# Patient Record
Sex: MALE | Race: BLACK OR AFRICAN AMERICAN | NOT HISPANIC OR LATINO | Employment: FULL TIME | URBAN - METROPOLITAN AREA
[De-identification: names, ages, dates, MRNs, and addresses within clinical notes are randomized per-mention and may not be internally consistent; named-entity substitution may affect disease eponyms.]

---

## 2020-04-24 ENCOUNTER — CLINICAL SUPPORT (OUTPATIENT)
Dept: URGENT CARE | Facility: CLINIC | Age: 35
End: 2020-04-24
Payer: MEDICAID

## 2020-04-24 VITALS
OXYGEN SATURATION: 99 % | TEMPERATURE: 98 F | RESPIRATION RATE: 12 BRPM | WEIGHT: 152.81 LBS | HEART RATE: 77 BPM | SYSTOLIC BLOOD PRESSURE: 136 MMHG | DIASTOLIC BLOOD PRESSURE: 81 MMHG

## 2020-04-24 DIAGNOSIS — R36.9 PENILE DISCHARGE: ICD-10-CM

## 2020-04-24 DIAGNOSIS — Z20.2 STD EXPOSURE: Primary | ICD-10-CM

## 2020-04-24 DIAGNOSIS — N39.0 URINARY TRACT INFECTION WITHOUT HEMATURIA, SITE UNSPECIFIED: ICD-10-CM

## 2020-04-24 LAB
BILIRUB UR QL STRIP: NEGATIVE
GLUCOSE UR QL STRIP: NEGATIVE
KETONES UR QL STRIP: NEGATIVE
LEUKOCYTE ESTERASE UR QL STRIP: POSITIVE
PH, POC UA: 5.5
POC BLOOD, URINE: NEGATIVE
POC NITRATES, URINE: NEGATIVE
PROT UR QL STRIP: POSITIVE
SP GR UR STRIP: 1.02 (ref 1–1.03)
UROBILINOGEN UR STRIP-ACNC: ABNORMAL (ref 0.3–2.2)

## 2020-04-24 PROCEDURE — 99204 OFFICE O/P NEW MOD 45 MIN: CPT | Mod: 25,S$GLB,, | Performed by: NURSE PRACTITIONER

## 2020-04-24 PROCEDURE — 81003 URINALYSIS AUTO W/O SCOPE: CPT | Mod: QW,S$GLB,, | Performed by: NURSE PRACTITIONER

## 2020-04-24 PROCEDURE — 99204 PR OFFICE/OUTPT VISIT, NEW, LEVL IV, 45-59 MIN: ICD-10-PCS | Mod: 25,S$GLB,, | Performed by: NURSE PRACTITIONER

## 2020-04-24 PROCEDURE — 81003 POCT URINALYSIS, DIPSTICK, AUTOMATED, W/O SCOPE: ICD-10-PCS | Mod: QW,S$GLB,, | Performed by: NURSE PRACTITIONER

## 2020-04-24 RX ORDER — CEFTRIAXONE 500 MG/1
500 INJECTION, POWDER, FOR SOLUTION INTRAMUSCULAR; INTRAVENOUS
Status: COMPLETED | OUTPATIENT
Start: 2020-04-24 | End: 2020-04-24

## 2020-04-24 RX ORDER — NITROFURANTOIN 25; 75 MG/1; MG/1
100 CAPSULE ORAL 2 TIMES DAILY
Qty: 14 CAPSULE | Refills: 0 | Status: SHIPPED | OUTPATIENT
Start: 2020-04-24 | End: 2020-05-01

## 2020-04-24 RX ORDER — AZITHROMYCIN 1 G/1
1 POWDER, FOR SUSPENSION ORAL ONCE
Qty: 1 PACKET | Refills: 0 | Status: SHIPPED | OUTPATIENT
Start: 2020-04-24 | End: 2020-04-24

## 2020-04-24 RX ORDER — METRONIDAZOLE 500 MG/1
500 TABLET ORAL EVERY 12 HOURS
Qty: 14 TABLET | Refills: 0 | Status: SHIPPED | OUTPATIENT
Start: 2020-04-24 | End: 2020-05-01

## 2020-04-24 RX ADMIN — CEFTRIAXONE 500 MG: 500 INJECTION, POWDER, FOR SOLUTION INTRAMUSCULAR; INTRAVENOUS at 11:04

## 2020-04-24 NOTE — PROGRESS NOTES
Subjective:       Patient ID: Kenrick Peres is a 34 y.o. male.    Vitals:  weight is 69.3 kg (152 lb 12.8 oz). His temperature is 98.4 °F (36.9 °C). His blood pressure is 136/81 and his pulse is 77. His respiration is 12 and oxygen saturation is 99%.     Chief Complaint: Penile Discharge    Patient complains of yellow penile discharge that began this week. Reports he is also having urinary burning. Patient reports he has had one sexual partner.     Penile Discharge   The patient's primary symptoms include penile discharge. This is a new problem. The current episode started in the past 7 days (x 2 days ). The problem occurs constantly. The problem has been unchanged. Associated symptoms include dysuria, frequency and urgency. Pertinent negatives include no chest pain, chills, coughing, diarrhea, fever, headaches, nausea, rash, shortness of breath, sore throat or vomiting. Associated symptoms comments: Penile burning with yellow discharge . The patient's penis is circumcised.The penile discharge was yellow. He has tried nothing for the symptoms. The treatment provided no relief. He is sexually active. He inconsistently uses condoms. It is possible that his partner has an STD.       Constitution: Negative for chills, fatigue and fever.   HENT: Negative for congestion and sore throat.    Neck: Negative for painful lymph nodes.   Cardiovascular: Negative for chest pain and leg swelling.   Eyes: Negative for double vision and blurred vision.   Respiratory: Negative for cough and shortness of breath.    Gastrointestinal: Negative for nausea, vomiting and diarrhea.   Genitourinary: Positive for dysuria, frequency, urgency and penile discharge.   Musculoskeletal: Negative for joint pain, joint swelling, muscle cramps and muscle ache.   Skin: Negative for color change, pale and rash.   Allergic/Immunologic: Negative for seasonal allergies.   Neurological: Negative for dizziness, history of vertigo, light-headedness,  passing out and headaches.   Hematologic/Lymphatic: Negative for swollen lymph nodes, easy bruising/bleeding and history of blood clots. Does not bruise/bleed easily.   Psychiatric/Behavioral: Negative for nervous/anxious, sleep disturbance and depression. The patient is not nervous/anxious.        Objective:      Physical Exam   Constitutional: He is oriented to person, place, and time. Vital signs are normal. He appears well-developed and well-nourished. He is cooperative.  Non-toxic appearance. He does not have a sickly appearance. He does not appear ill. No distress.   HENT:   Head: Normocephalic and atraumatic.   Right Ear: Hearing, tympanic membrane, external ear and ear canal normal.   Left Ear: Hearing, tympanic membrane, external ear and ear canal normal.   Nose: Nose normal. No mucosal edema, rhinorrhea or nasal deformity. No epistaxis. Right sinus exhibits no maxillary sinus tenderness and no frontal sinus tenderness. Left sinus exhibits no maxillary sinus tenderness and no frontal sinus tenderness.   Mouth/Throat: Uvula is midline, oropharynx is clear and moist and mucous membranes are normal. No trismus in the jaw. Normal dentition. No uvula swelling. No posterior oropharyngeal erythema.   Eyes: Conjunctivae and lids are normal. Right eye exhibits no discharge. Left eye exhibits no discharge. No scleral icterus.   Neck: Trachea normal, normal range of motion, full passive range of motion without pain and phonation normal. Neck supple.   Cardiovascular: Normal rate, regular rhythm, normal heart sounds, intact distal pulses and normal pulses.   Pulmonary/Chest: Effort normal and breath sounds normal. No respiratory distress.   Abdominal: Soft. Normal appearance and bowel sounds are normal. He exhibits no distension, no pulsatile midline mass and no mass. There is no tenderness.   Genitourinary: Circumcised.   Musculoskeletal: Normal range of motion. He exhibits no edema or deformity.   Neurological: He is  alert and oriented to person, place, and time. He exhibits normal muscle tone. Coordination normal. GCS eye subscore is 4. GCS verbal subscore is 5. GCS motor subscore is 6.   Skin: Skin is warm, dry, intact, not diaphoretic and not pale.   Psychiatric: He has a normal mood and affect. His speech is normal and behavior is normal. Judgment and thought content normal. Cognition and memory are normal.   Nursing note and vitals reviewed.        Assessment:       1. STD exposure    2. Penile discharge    3. Urinary tract infection without hematuria, site unspecified        Plan:       After careful evaluation of the patient's physical exam and history of present illness, I believe the patient is at risk for a sexually transmitted disease due to either possible exposure for known exposure.  I will treat the patient with rocephin in the clinic.  I discussed abstaining from sex ×2 weeks, and must inform all sexual partners of possible STD exposure.  Instructed patient to follow up with their primary care, and I provided an STD clinic contact information for continued evaluation.  Patient was allowed to ask questions, and verbalizes understanding    The patient's symptoms are consistent with a urinary tract infection.  The patient does not appear to have pyelonephritis, urinary retention, ureterolithiasis, or urosepsis.  The patient will be treated with macrobid pending a urine culture. Instructed patient to follow up with PCP or urology if symptoms continue. Instructions given on when to go to the ED.     Advised: Complete all your antibiotics. Refrain from having sex for 2 weeks. We will call you with the results of your labs, if anything is positive, your partner(s) must be treated.   DO NOT drink alcohol while taking Metronidazole (Flagyl).  You were not tested today for life threatening sexually transmitted diseases, such as HIV and Syphilis.  For further evaluation: Follow up at                      [facility]                    Sexual [facility] Clinic                    [address] [facility] [address]                    [phone]    STD exposure  -     POCT Urinalysis, Dipstick, Automated, W/O Scope  -     CT/NG, T. vaginalis; Future; Expected date: 04/24/2020  -     Culture, Urine    Penile discharge    Urinary tract infection without hematuria, site unspecified    Other orders  -     cefTRIAXone injection 500 mg  -     azithromycin (ZITHROMAX) 1 gram Pack; Take 1 g by mouth once. for 1 dose  Dispense: 1 packet; Refill: 0  -     nitrofurantoin, macrocrystal-monohydrate, (MACROBID) 100 MG capsule; Take 1 capsule (100 mg total) by mouth 2 (two) times daily. for 7 days  Dispense: 14 capsule; Refill: 0  -     metroNIDAZOLE (FLAGYL) 500 MG tablet; Take 1 tablet (500 mg total) by mouth every 12 (twelve) hours. for 7 days  Dispense: 14 tablet; Refill: 0

## 2020-04-24 NOTE — PATIENT INSTRUCTIONS
Complete all your antibiotics. Refrain from having sex for 2 weeks. We will call you with the results of your labs, if anything is positive, your partner(s) must be treated.   DO NOT drink alcohol while taking Metronidazole (Flagyl).  You were not tested today for life threatening sexually transmitted diseases, such as HIV and Syphilis.  For further evaluation: Follow up at                      [facility]                    Sexual Health Clinic                    [address] [place] [address]                    [phone]      Urinary Tract Infections in Men    Urinary tract infections (UTIs) are most often caused by bacteria that invade the urinary tract. The bacteria may come from outside the body. Or they may travel from the skin outside of rectum into the urethra. Pain in or around the urinary tract is a common symptom for most UTIs. But the only way to know for sure if you have a UTI is to have a urinalysis and urine culture.   Types of UTIs  · Cystitis: This is a bladder infection and is often linked to a blockage from an enlarged prostate. You may have an urgent or frequent need to urinate, and bloody urine. Treatment includes antibiotics and medicine to relax or shrink the prostate. Sometimes, surgery is needed.  · Urethritis: This is an infection of the urethra. You may have a discharge from the urethra or burning when you urinate. You may also have pain in the urethra or penis. It is treated with antibiotics.  · Prostatitis: This is an inflammation or infection of the prostate. You may have an urgent or frequent need to urinate, fever, or burning when you urinate. Or you may have a tender prostate, or a vague feeling of pressure. Prostatitis is treated with a range of medicines, depending on the cause.  · Pyelonephritis: This is a kidney infection. If not treated, it can be serious and damage your kidneys. In severe cases you may be hospitalized.  You may have a fever and upper back pain.  Treating a UTI  · Medicine: Most UTIs are treated with antibiotics. These kill the bacteria. The length of time you need to take them depends on the type of infection. Take antibiotics exactly as directed until all of the medicine is gone. If you don't, the infection may not go away and may become harder to treat. For certain types of UTIs, you may be given other medicine to help treat your symptoms.  · Lifestyle changes: The lifestyle changes below will help get rid of your current infection. They may also help prevent future UTIs.  ¨ Drink plenty of fluids such as water, juice, or other caffeine-free drinks. This helps flush bacteria out of your system.  ¨ Empty your bladder when you feel the urge to urinate and before going to sleep. Urine that stays in your bladder promotes infection.  ¨ Use condoms during sex. These help prevent UTIs caused by sexually transmitted bacteria.  ¨ Keep follow-up appointments with your healthcare provider. He or she can may do tests to make sure the infection has cleared. If needed, more treatment can be started.  · Other treatment: Most UTIs respond to medicine. But sometimes a procedure or surgery is needed. This can treat an enlarged prostate, or remove a kidney stone or other blockage. Surgery may also treat problems caused by scarring or long-term infections.  Date Last Reviewed: 1/1/2017 © 2000-2017 [employer]. [address] [place] [address]. All rights reserved. This information is not intended as a substitute for professional medical care. Always follow your healthcare professional's instructions.        Suspected STI, Culture Only  Your symptoms suggest that you may have a sexually transmitted infection (STI). The most common bacteria that cause STIs are chlamydia and gonorrhea. Both are highly contagious. They are passed by sexual contact with an infected partner.  Symptoms begin within 1 to 3 weeks  after exposure. There is usually a discharge from the penis or vagina and burning during urination. Many women with one of these infections will have only mild symptoms or no symptoms at all early in the disease.  Culture tests have been taken. These will show if you have an infection with chlamydia or gonorrhea. These infections can be treated and cured with antibiotic medicine.  Home care  Do not have sex until you know that your test result is negative.  If a culture was done, call for the results. If the culture is positive, contact your healthcare provider, local clinic, or local public health department to be treated, or return to our facility.  · You will be prescribed antibiotic medicine. Be sure to take all of the antibiotic as prescribed until it is gone or you are told to stop. Keep taking it even if you feel better.  · Both you and your sexual partner or partners need to be treated, even if the partner has no symptoms.  · Do not have sex until both you and your partner or partners have finished all antibiotic medicine and you are told that you are no longer contagious.  Learn about safe sex practices and use these in the future. The safest sex is with a partner who has tested negative for STIs and only has sex with you. Condoms can help prevent the spread of gonorrhea and chlamydia, but are not a guarantee.  Follow-up care  Follow up with your healthcare provider or as advised by our staff. Call as directed for the results of your culture. If your culture test is positive and you are treated with an antibiotic, you should have another culture taken 4 to 6 weeks after treatment. This is to be sure the infection has cleared. Follow up with your provider or the public health department for complete STI screening, including HIV testing. For more information about STIs, call the CDC information line at 955-108-2807.  When to seek medical advice  Call your healthcare provider if any of these  occur:  · Fever of 100.4ºF (38.0ºC) or higher, or as directed  · New pain in your lower belly (abdomen) or back or pain that gets worse  · Unexpected vaginal bleeding  · Weakness, dizziness, or fainting  · Repeated vomiting  · Inability to urinate because of pain  · Rash or joint pain  · Painful open sores on the penis or around the outer vagina  · Enlarged painful lumps (lymph nodes) in the groin  · Testicle pain or scrotal swelling in men  Date Last Reviewed: 9/25/2015  © 7848-1573 [employer]. [address] [place] [address]. All rights reserved. This information is not intended as a substitute for professional medical care. Always follow your healthcare professional's instructions.

## 2020-04-25 LAB
C TRACH RRNA SPEC QL NAA+PROBE: NEGATIVE
N GONORRHOEA RRNA SPEC QL NAA+PROBE: POSITIVE
T VAGINALIS DNA SPEC QL NAA+PROBE: NEGATIVE

## 2020-04-26 LAB
BACTERIA UR CULT: NO GROWTH
BACTERIA UR CULT: NORMAL

## 2020-08-21 ENCOUNTER — OFFICE VISIT (OUTPATIENT)
Dept: URGENT CARE | Facility: CLINIC | Age: 35
End: 2020-08-21
Payer: MEDICAID

## 2020-08-21 VITALS
SYSTOLIC BLOOD PRESSURE: 116 MMHG | BODY MASS INDEX: 18.75 KG/M2 | DIASTOLIC BLOOD PRESSURE: 73 MMHG | WEIGHT: 150.81 LBS | HEIGHT: 75 IN | TEMPERATURE: 97 F | OXYGEN SATURATION: 98 % | HEART RATE: 64 BPM

## 2020-08-21 DIAGNOSIS — M79.642 LEFT HAND PAIN: ICD-10-CM

## 2020-08-21 DIAGNOSIS — S62.657A CLOSED NONDISPLACED FRACTURE OF MIDDLE PHALANX OF LEFT LITTLE FINGER, INITIAL ENCOUNTER: Primary | ICD-10-CM

## 2020-08-21 PROCEDURE — 99214 PR OFFICE/OUTPT VISIT, EST, LEVL IV, 30-39 MIN: ICD-10-PCS | Mod: S$GLB,25,, | Performed by: NURSE PRACTITIONER

## 2020-08-21 PROCEDURE — 73130 X-RAY EXAM OF HAND: CPT | Mod: LT,S$GLB,, | Performed by: NURSE PRACTITIONER

## 2020-08-21 PROCEDURE — 29130 PR APPLY FINGER SPLINT,STATIC: ICD-10-PCS | Mod: F4,S$GLB,, | Performed by: NURSE PRACTITIONER

## 2020-08-21 PROCEDURE — 29130 APPL FINGER SPLINT STATIC: CPT | Mod: F4,S$GLB,, | Performed by: NURSE PRACTITIONER

## 2020-08-21 PROCEDURE — 73130 PR  X-RAY HAND 3+ VW: ICD-10-PCS | Mod: LT,S$GLB,, | Performed by: NURSE PRACTITIONER

## 2020-08-21 PROCEDURE — 99214 OFFICE O/P EST MOD 30 MIN: CPT | Mod: S$GLB,25,, | Performed by: NURSE PRACTITIONER

## 2020-08-21 RX ORDER — HYDROCODONE BITARTRATE AND ACETAMINOPHEN 5; 325 MG/1; MG/1
1 TABLET ORAL EVERY 6 HOURS PRN
Qty: 10 TABLET | Refills: 0 | Status: SHIPPED | OUTPATIENT
Start: 2020-08-21 | End: 2021-04-21

## 2020-08-21 RX ORDER — DICLOFENAC SODIUM 50 MG/1
50 TABLET, DELAYED RELEASE ORAL 3 TIMES DAILY PRN
Qty: 30 TABLET | Refills: 0 | Status: SHIPPED | OUTPATIENT
Start: 2020-08-21 | End: 2021-04-21

## 2020-08-21 RX ORDER — AZELASTINE 1 MG/ML
1 SPRAY, METERED NASAL 2 TIMES DAILY
Qty: 30 ML | Refills: 0 | Status: SHIPPED | OUTPATIENT
Start: 2020-08-21 | End: 2021-04-21 | Stop reason: SDUPTHER
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
## 2020-08-21 NOTE — PROCEDURES
SPLINT APPLICATION    Date/Time: 8/21/2020 9:25 AM  Performed by: KATHY Choi  Authorized by: KATHY Choi   Location details: left small finger  Splint type: static finger  Supplies used: aluminum splint  Post-procedure: The splinted body part was neurovascularly unchanged following the procedure.  Patient tolerance: Patient tolerated the procedure well with no immediate complications

## 2020-08-25 ENCOUNTER — TELEPHONE (OUTPATIENT)
Dept: URGENT CARE | Facility: CLINIC | Age: 35
End: 2020-08-25

## 2020-09-16 ENCOUNTER — TELEPHONE (OUTPATIENT)
Dept: ORTHOPEDICS | Facility: CLINIC | Age: 35
End: 2020-09-16

## 2020-09-16 DIAGNOSIS — S62.657A CLOSED NONDISPLACED FRACTURE OF MIDDLE PHALANX OF LEFT LITTLE FINGER, INITIAL ENCOUNTER: Primary | ICD-10-CM

## 2020-09-16 NOTE — TELEPHONE ENCOUNTER
Spoke with pt. Pt confirmed appt for tomorrow. Informed pt we will need xrays prior to appt. Images ordered and scheduled. Pt verbalized understanding.

## 2020-09-17 ENCOUNTER — OFFICE VISIT (OUTPATIENT)
Dept: ORTHOPEDICS | Facility: CLINIC | Age: 35
End: 2020-09-17
Payer: MEDICAID

## 2020-09-17 VITALS
HEIGHT: 75 IN | DIASTOLIC BLOOD PRESSURE: 63 MMHG | SYSTOLIC BLOOD PRESSURE: 122 MMHG | BODY MASS INDEX: 19.05 KG/M2 | WEIGHT: 153.25 LBS | HEART RATE: 66 BPM

## 2020-09-17 DIAGNOSIS — S62.627S CLOSED DISPLACED FRACTURE OF MIDDLE PHALANX OF LEFT LITTLE FINGER, SEQUELA: Primary | ICD-10-CM

## 2020-09-17 PROCEDURE — 99203 PR OFFICE/OUTPT VISIT, NEW, LEVL III, 30-44 MIN: ICD-10-PCS | Mod: S$PBB,,, | Performed by: ORTHOPAEDIC SURGERY

## 2020-09-17 PROCEDURE — 99203 OFFICE O/P NEW LOW 30 MIN: CPT | Mod: S$PBB,,, | Performed by: ORTHOPAEDIC SURGERY

## 2020-09-17 PROCEDURE — 99213 OFFICE O/P EST LOW 20 MIN: CPT | Mod: PBBFAC,PN | Performed by: ORTHOPAEDIC SURGERY

## 2020-09-17 PROCEDURE — 99999 PR PBB SHADOW E&M-EST. PATIENT-LVL III: CPT | Mod: PBBFAC,,, | Performed by: ORTHOPAEDIC SURGERY

## 2020-09-17 PROCEDURE — 99999 PR PBB SHADOW E&M-EST. PATIENT-LVL III: ICD-10-PCS | Mod: PBBFAC,,, | Performed by: ORTHOPAEDIC SURGERY
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
## 2020-09-17 NOTE — LETTER
September 17, 2020      Liset Arias, FN  [address]  [place] [address]           [facility]  Orthopedics  [address], [facility] [address]  [facility] [address]  Phone: [phone]  Fax: [phone]          Patient: Kenrick Peres   MR Number: [id number]   YOB: 1985   Date of Visit: 9/17/2020       Dear Liset Arias:    Thank you for referring Kenrick Peres to me for evaluation. Attached you will find relevant portions of my assessment and plan of care.    If you have questions, please do not hesitate to call me. I look forward to following Kenrick Peres along with you.    Sincerely,    Siddhartha Joe MD    Enclosure  CC:  No Recipients    If you would like to receive this communication electronically, please contact [email or web address] or [phone] to request more information on [email or web address] Link access.    For providers and/or their staff who would like to refer a patient to [facility], please contact us through our one-stop-shop provider referral line, [facility], at [phone].    If you feel you have received this communication in error or would no longer like to receive these types of communications, please e-mail [email or web address]

## 2020-10-23 ENCOUNTER — OFFICE VISIT (OUTPATIENT)
Dept: URGENT CARE | Facility: CLINIC | Age: 35
End: 2020-10-23
Payer: MEDICAID

## 2020-10-23 VITALS
BODY MASS INDEX: 18.53 KG/M2 | WEIGHT: 149 LBS | HEIGHT: 75 IN | DIASTOLIC BLOOD PRESSURE: 81 MMHG | TEMPERATURE: 99 F | SYSTOLIC BLOOD PRESSURE: 138 MMHG | HEART RATE: 104 BPM | RESPIRATION RATE: 16 BRPM | OXYGEN SATURATION: 98 %

## 2020-10-23 DIAGNOSIS — K40.90 UNILATERAL INGUINAL HERNIA WITHOUT OBSTRUCTION OR GANGRENE, RECURRENCE NOT SPECIFIED: Primary | ICD-10-CM

## 2020-10-23 PROCEDURE — 99214 OFFICE O/P EST MOD 30 MIN: CPT | Mod: S$GLB,,, | Performed by: NURSE PRACTITIONER

## 2020-10-23 PROCEDURE — 99214 PR OFFICE/OUTPT VISIT, EST, LEVL IV, 30-39 MIN: ICD-10-PCS | Mod: S$GLB,,, | Performed by: NURSE PRACTITIONER

## 2020-10-23 NOTE — PROGRESS NOTES
"Subjective:       Patient ID: Kenrick Peres is a 35 y.o. male.    Vitals:  height is 6' 3" (1.905 m) and weight is 67.6 kg (149 lb). His temperature is 98.7 °F (37.1 °C). His blood pressure is 138/81 and his pulse is 104. His respiration is 16 and oxygen saturation is 98%.     Chief Complaint: Inguinal Hernia    Kenrick Peres is a 35 year old male presenting to the clinic with c/o right groin pain and swelling. He states that he has had pain for the past three days. The swelling increases with certain positions. He has had no testicular pain or swelling.      Constitution: Negative for chills, fatigue and fever.   HENT: Negative for congestion and sore throat.    Neck: Negative for painful lymph nodes.   Cardiovascular: Negative for chest pain and leg swelling.   Eyes: Negative for double vision and blurred vision.   Respiratory: Negative for cough and shortness of breath.    Gastrointestinal: Negative for nausea, vomiting and diarrhea.   Genitourinary: Negative for dysuria, frequency, urgency and testicular pain.        Groin swelling     Musculoskeletal: Negative for joint pain, joint swelling, muscle cramps and muscle ache.   Skin: Negative for color change, pale and rash.   Allergic/Immunologic: Negative for seasonal allergies.   Neurological: Negative for dizziness, history of vertigo, light-headedness, passing out and headaches.   Hematologic/Lymphatic: Negative for swollen lymph nodes, easy bruising/bleeding and history of blood clots. Does not bruise/bleed easily.   Psychiatric/Behavioral: Negative for nervous/anxious, sleep disturbance and depression. The patient is not nervous/anxious.        Objective:      Physical Exam   Constitutional: He is oriented to person, place, and time. He appears well-developed. He is cooperative.  Non-toxic appearance. He does not appear ill. No distress.   HENT:   Head: Normocephalic and atraumatic.   Ears:   Right Ear: Hearing, tympanic membrane, external ear and ear " canal normal.   Left Ear: Hearing, tympanic membrane, external ear and ear canal normal.   Nose: Nose normal. No mucosal edema, rhinorrhea or nasal deformity. No epistaxis. Right sinus exhibits no maxillary sinus tenderness and no frontal sinus tenderness. Left sinus exhibits no maxillary sinus tenderness and no frontal sinus tenderness.   Mouth/Throat: Uvula is midline, oropharynx is clear and moist and mucous membranes are normal. No trismus in the jaw. Normal dentition. No uvula swelling. No posterior oropharyngeal erythema.   Eyes: Conjunctivae and lids are normal. Right eye exhibits no discharge. Left eye exhibits no discharge. No scleral icterus.   Neck: Trachea normal, normal range of motion, full passive range of motion without pain and phonation normal. Neck supple.   Cardiovascular: Normal rate, regular rhythm, normal heart sounds and normal pulses.   Pulmonary/Chest: Effort normal and breath sounds normal. No respiratory distress.   Abdominal: Soft. Normal appearance and bowel sounds are normal. He exhibits no distension, no pulsatile midline mass and no mass. There is no abdominal tenderness. A hernia is present. Hernia confirmed positive in the right inguinal area.   Genitourinary:         Comments: No erythema, minimal tenderness     Musculoskeletal: Normal range of motion.         General: No deformity.   Neurological: He is alert and oriented to person, place, and time. He exhibits normal muscle tone. Coordination normal.   Skin: Skin is warm, dry, intact, not diaphoretic and not pale. Psychiatric: His speech is normal and behavior is normal. Judgment and thought content normal.   Nursing note and vitals reviewed.        Assessment:       1. Unilateral inguinal hernia without obstruction or gangrene, recurrence not specified        Plan:       Hernia does not appear to be strangulated. Advised patient to use hernia belt and avoid heavy lifting or anything strenuous. Referral placed for general  surgery.  Strict ER precautions discussed and patient verbalized understanding.     Unilateral inguinal hernia without obstruction or gangrene, recurrence not specified  -     Ambulatory referral/consult to General Surgery

## 2020-10-23 NOTE — PATIENT INSTRUCTIONS
Hernia (Adult)    A hernia can happen when there is a weakness or defect in the wall of the abdomen or groin. Intestines or nearby tissues may move from their usual location and push through the weakness in the wall. This can cause a hernia (bulge) you may see or feel.  Causes and Risk Factors   A hernia may be present at birth. Or it may be caused by the wear and tear of daily living. Certain factors can make a hernia more likely. These can include:  · Heavy lifting  · Straining, whether from lifting, movement, or constipation  · Chronic cough  · Injury to the abdominal wall  · Excess weight  · Pregnancy  · Prior surgery  · Older age  · Family history of hernia  Symptoms  Symptoms of a hernia may come on suddenly. Or they may appear slowly over time. Some common symptoms include:  · Bulge in the groin area, around the navel, or in the scrotum (the bulge may get bigger when you stand and go away when you lie down)  · Pain or pressure around the bulge  · Pain during activities such as lifting, coughing, or sneezing  · A feeling of weakness or pressure in the groin  · Pain or swelling in the scrotum  Types of hernias  There are different types of hernia. The type you have depends on its location:  · Inguinal: This type is in the groin or scrotum. It is more common in men.  · Femoral: This type is in the groin, upper thigh (where the leg bends), or labia. It is more common in women.  · Ventral: This type is in the abdominal wall.  · Umbilical: This type occurs around the navel (belly button).  · Incisional: This type occurs at the site of a previous surgery.  The condition of the hernia can help determine how urgently it needs to be treated.  · Reducible: It goes back in by itself, or it can be pushed back in.  · Irreducible: It cant be pushed back in.  · Incarcerated/Strangulated: The intestine is trapped (incarcerated). If this happens, you wont be able to push the bulge back in. If the incarcerated hernia isnt  treated, it may become strangulated. This means the area loses blood supply and the tissue may die. This requires emergency surgery! Treatment is needed right away!  In most cases, a hernia will not heal on its own. Surgery is usually needed to repair the defect in the abdominal wall or groin. Youll be told more about surgery, if needed.  If your symptoms are not severe, treatment may sometimes be delayed. In such cases, regular follow-up visits with the provider will be needed. Youll be asked to keep track of your symptoms and to watch for signs of more serious problems. You may also be given guidelines similar to the home care instructions below.  Home Care  To help keep a hernia from getting worse, you may be advised to:  · Avoid heavy lifting and straining as directed.  · Take steps to prevent constipation, such as eating more fiber and drinking more water. This may help reduce straining that can occur when having a bowel movement. Reducing straining may help keep your symptoms from getting worse.  · Maintain a healthy weight or lose excess weight. This can help reduce strain on abdominal muscles and tissues.  · Stop smoking. This can help prevent coughing that may also strain abdominal muscles and tissues.  Follow-up care  Follow up with your healthcare provider, or as directed. If imaging tests were done, they will be reviewed a doctor. You will be told the results and any new findings that may affect your care.  When to seek medical advice  Call your healthcare provider right away if any of these occur:  · Hernia hardens, swells, or grows larger  · Hernia can no longer be pushed back in  · Pain moves to the lower right abdomen (just below the waistline), or spreads to the back  Call 911  Call 911 right away if any of these occur:  · Nausea and vomiting  · Severe pain, redness, or tenderness in the area near the hernia  · Pain worsens quickly and doesnt get better  · Inability to have a bowel movement or  pass gas  · Fever of 100.4°F (38°C) or higher  · Trouble breathing  · Fainting  · Rapid heart rate  · Vomiting blood  · Large amounts of blood in stool  Date Last Reviewed: 6/9/2015 © 2000-2017 [employer]. [address] [place] [address], [place] [address]. All rights reserved. This information is not intended as a substitute for professional medical care. Always follow your healthcare professional's instructions.        What Is a Hernia?    A hernia is when an organ or tissue pushes through a weak area in the belly (abdominal) wall. This weak area may be present at birth. Or it may be caused by abdominal strain over time. If not treated, a hernia can get worse with time and physical stress.  When a bulge forms  When there is a weak area in the abdominal wall, an organ or tissue can push outward. This often causes a bulge that you can see under your skin. The bulge may get bigger when you stand up. It may go away when you lie down. You may also feel some pressure or mild pain when lifting, coughing, urinating, or doing other activities.  Types of hernias  The type of hernia you have depends on its location. Most hernias form in the groin at or near the internal ring. This is the entrance to a canal between the abdomen and groin. Hernias can also occur in the abdomen, thigh, or genitals.  · An incisional hernia occurs at the site of a previous surgical incision.  · An umbilical hernia occurs at the navel.  · An indirect inguinal hernia occurs in the groin at the internal ring.  · A direct inguinal hernia occurs in the groin near the internal ring.  · A femoral hernia occurs just below the groin.  · An epigastric hernia occurs in the upper abdomen at the midline.  Diagnosis  In most cases, your healthcare provider can diagnose a hernia by doing a physical exam.  In some cases it might not be clear why you have a swelling in the belly wall. Then your provider may order an imaging test such as an ultrasound. This  can help with the diagnosis.  Surgery  A hernia will not heal on its own. Surgery is needed to fix the weak spot in the abdominal wall. If not treated, a hernia can get larger. It can also cause serious health problems. The good news is that hernia surgery can be done quickly and safely. In some cases, you can go home the same day as your surgery.   When to call your provider  Call your healthcare provider right away if the swelling around your hernia becomes larger, firmer, or more painful. These may be signs that your intestines are stuck in the abdominal wall. This is an emergency. The hernia must be repaired right away to avoid serious problems.  Date Last Reviewed: 7/1/2016  © 0707-1737 [employer]. [address] [place] [address], [place] [address]. All rights reserved. This information is not intended as a substitute for professional medical care. Always follow your healthcare professional's instructions.

## 2020-10-26 ENCOUNTER — TELEPHONE (OUTPATIENT)
Dept: SURGERY | Facility: CLINIC | Age: 35
End: 2020-10-26

## 2020-10-26 NOTE — TELEPHONE ENCOUNTER
----- Message from Opal Bagley LPN sent at 10/26/2020  1:51 PM CDT -----    ----- Message -----  From: Gilda Arteaga  Sent: 10/26/2020  11:06 AM CDT  To: [facility] General Surgery Clinical Support    Appointment  Request      Who called:Patient  Reason for visit:hernia  New or Existing Patient:new  Callback or [facility] response:callback  Best contact number:[phone]  Additional information:referral in epic

## 2020-10-26 NOTE — TELEPHONE ENCOUNTER
Pt has medicaid no available appointments listed pt given access line number for further assistance

## 2020-10-27 ENCOUNTER — OFFICE VISIT (OUTPATIENT)
Dept: SURGERY | Facility: CLINIC | Age: 35
End: 2020-10-27
Payer: MEDICAID

## 2020-10-27 VITALS
RESPIRATION RATE: 18 BRPM | DIASTOLIC BLOOD PRESSURE: 68 MMHG | SYSTOLIC BLOOD PRESSURE: 112 MMHG | WEIGHT: 148.38 LBS | HEART RATE: 97 BPM | BODY MASS INDEX: 18.45 KG/M2 | OXYGEN SATURATION: 98 % | HEIGHT: 75 IN

## 2020-10-27 DIAGNOSIS — L04.8 ACUTE INGUINAL LYMPHADENITIS: ICD-10-CM

## 2020-10-27 PROCEDURE — 99204 PR OFFICE/OUTPT VISIT, NEW, LEVL IV, 45-59 MIN: ICD-10-PCS | Mod: S$PBB,,, | Performed by: SURGERY

## 2020-10-27 PROCEDURE — 99999 PR PBB SHADOW E&M-EST. PATIENT-LVL IV: CPT | Mod: PBBFAC,,, | Performed by: SURGERY

## 2020-10-27 PROCEDURE — 99214 OFFICE O/P EST MOD 30 MIN: CPT | Mod: PBBFAC,PN | Performed by: SURGERY

## 2020-10-27 PROCEDURE — 99204 OFFICE O/P NEW MOD 45 MIN: CPT | Mod: S$PBB,,, | Performed by: SURGERY

## 2020-10-27 PROCEDURE — 99999 PR PBB SHADOW E&M-EST. PATIENT-LVL IV: ICD-10-PCS | Mod: PBBFAC,,, | Performed by: SURGERY

## 2020-10-27 RX ORDER — OXYCODONE AND ACETAMINOPHEN 7.5; 325 MG/1; MG/1
1 TABLET ORAL EVERY 4 HOURS PRN
Qty: 15 TABLET | Refills: 0 | Status: SHIPPED | OUTPATIENT
Start: 2020-10-27 | End: 2021-04-21

## 2020-10-27 RX ORDER — DOXYCYCLINE HYCLATE 100 MG
100 TABLET ORAL 2 TIMES DAILY
Qty: 20 TABLET | Refills: 0 | OUTPATIENT
Start: 2020-10-27 | End: 2020-11-02

## 2020-10-27 RX ORDER — SULFAMETHOXAZOLE AND TRIMETHOPRIM 400; 80 MG/1; MG/1
1 TABLET ORAL 2 TIMES DAILY
Qty: 20 TABLET | Refills: 0 | OUTPATIENT
Start: 2020-10-27 | End: 2020-11-02

## 2020-10-27 NOTE — PROGRESS NOTES
History & Physical    SUBJECTIVE:     History of Present Illness:  Patient is a 35 y.o. male presents with pain and swelling to his right groin area concerning for hernia.  He states this started few days ago and has progressively gotten worse.  Status started as a small bump years inguinal area and now has spread involving is whole inguinal crease and has become tender and red.  He denies any recent travel in the woods or on the country.  Denies any recent new sexual contacts.    States he tried a hernia belt which with compression he believes is cause it to get worse.  On examination is extremely tender to palpation in the area, and I did not feel any reducible hernia.  It feels like it is likely lymphadenitis with some surrounding cellulitis.    Due to the concern in this fact that the patient stated it was a bulge a few days ago or CT scan to confirm this was not an incarcerated hernia with strangulation.  CT scan came back as lymphadenitis in the right groin and so discussion was made with patient to treat with antibiotics and pain medicine.      Chief Complaint   Patient presents with    Hernia       Review of patient's allergies indicates:  No Known Allergies    Current Outpatient Medications   Medication Sig Dispense Refill    azelastine (ASTELIN) 137 mcg (0.1 %) nasal spray 1 spray (137 mcg total) by Nasal route 2 (two) times daily. (Patient not taking: Reported on 9/17/2020) 30 mL 0    diclofenac (VOLTAREN) 50 MG EC tablet Take 1 tablet (50 mg total) by mouth 3 (three) times daily as needed. (Patient not taking: Reported on 9/17/2020) 30 tablet 0    doxycycline (VIBRA-TABS) 100 MG tablet Take 1 tablet (100 mg total) by mouth 2 (two) times daily. for 10 days 20 tablet 0    HYDROcodone-acetaminophen (NORCO) 5-325 mg per tablet Take 1 tablet by mouth every 6 (six) hours as needed for Pain. (Patient not taking: Reported on 9/17/2020) 10 tablet 0    oxyCODONE-acetaminophen (PERCOCET) 7.5-325 mg per tablet  "Take 1 tablet by mouth every 4 (four) hours as needed for Pain. 15 tablet 0    sulfamethoxazole-trimethoprim 400-80mg (BACTRIM,SEPTRA) 400-80 mg per tablet Take 1 tablet by mouth 2 (two) times daily. for 10 days 20 tablet 0     No current facility-administered medications for this visit.        History reviewed. No pertinent past medical history.  History reviewed. No pertinent surgical history.  History reviewed. No pertinent family history.  Social History     Tobacco Use    Smoking status: Former Smoker     Types: Cigarettes   Substance Use Topics    Alcohol use: Yes    Drug use: Not on file        Review of Systems:  Review of Systems   Constitutional: Negative for appetite change, fatigue, fever and unexpected weight change.   HENT: Negative for sore throat and trouble swallowing.    Eyes: Negative.    Respiratory: Negative for cough, shortness of breath and wheezing.    Cardiovascular: Negative for chest pain and leg swelling.   Gastrointestinal: Positive for abdominal pain (Groin swelling). Negative for abdominal distention, blood in stool, constipation, diarrhea, nausea and vomiting.   Endocrine: Negative.    Genitourinary: Negative.    Musculoskeletal: Negative for back pain.   Skin: Negative.  Negative for rash.   Allergic/Immunologic: Negative.    Neurological: Negative.    Hematological: Negative.    Psychiatric/Behavioral: Negative for confusion.       OBJECTIVE:     Vital Signs (Most Recent)  Pulse: 97 (10/27/20 1155)  Resp: 18 (10/27/20 1155)  BP: 112/68 (10/27/20 1155)  SpO2: 98 % (10/27/20 1155)  6' 3" (1.905 m)  67.3 kg (148 lb 5.9 oz)     Physical Exam:  Physical Exam  Vitals signs and nursing note reviewed.   Constitutional:       Appearance: He is well-developed.   HENT:      Head: Normocephalic and atraumatic.   Neck:      Musculoskeletal: Normal range of motion.   Cardiovascular:      Rate and Rhythm: Normal rate.      Heart sounds: Normal heart sounds.   Pulmonary:      Effort: Pulmonary " effort is normal.   Abdominal:      General: Bowel sounds are normal. There is no distension.      Palpations: Abdomen is soft.      Tenderness: There is no abdominal tenderness.   Musculoskeletal: Normal range of motion.   Skin:     General: Skin is warm and dry.      Capillary Refill: Capillary refill takes less than 2 seconds.          Neurological:      Mental Status: He is alert and oriented to person, place, and time.   Psychiatric:         Behavior: Behavior normal.           Diagnostic Results:  CT: Reviewed  Lymphadenopathy, possibly suppurative in the right groin    ASSESSMENT/PLAN:     35-year-old male with lymphadenitis, tenderness in right groin    PLAN:Plan   Antibiotics  Monitor for improvement  If patient does not improve by Friday told to come back to clinic for another evaluation and possible incision and drainage.

## 2020-10-27 NOTE — LETTER
October 27, 2020      Rizwana Camacho, NP  [address] [place] [address] [address] [place] [address]           [facility] [address]  [address]  [facility] [place] [address]  Phone: [phone]  Fax: [phone]          Patient: Kenrick Peres   MR Number: [id number]   YOB: 1985   Date of Visit: 10/27/2020       Dear Rizwana Camacho:    Thank you for referring Kenrick Peres to me for evaluation. Attached you will find relevant portions of my assessment and plan of care.    If you have questions, please do not hesitate to call me. I look forward to following Kenrick Peres along with you.    Sincerely,    Juan Pablo Miller MD    Enclosure  CC:  No Recipients    If you would like to receive this communication electronically, please contact [email or web address] or [phone] to request more information on [employer] access.    For providers and/or their staff who would like to refer a patient to [facility], please contact us through our one-stop-shop provider referral line, [facility], at [phone].    If you feel you have received this communication in error or would no longer like to receive these types of communications, please e-mail [email or web address]

## 2020-10-29 ENCOUNTER — HOSPITAL ENCOUNTER (EMERGENCY)
Facility: HOSPITAL | Age: 35
Discharge: HOME | End: 2020-10-29
Attending: EMERGENCY MEDICINE
Payer: MEDICAID

## 2020-10-29 VITALS
OXYGEN SATURATION: 100 % | WEIGHT: 152.13 LBS | SYSTOLIC BLOOD PRESSURE: 127 MMHG | BODY MASS INDEX: 18.52 KG/M2 | RESPIRATION RATE: 18 BRPM | DIASTOLIC BLOOD PRESSURE: 69 MMHG | HEART RATE: 87 BPM | TEMPERATURE: 98 F | HEIGHT: 76 IN

## 2020-10-29 DIAGNOSIS — L02.91 ABSCESS: Primary | ICD-10-CM

## 2020-10-29 LAB
ALBUMIN SERPL BCP-MCNC: 2.6 G/DL (ref 3.5–5.2)
ALP SERPL-CCNC: 76 U/L (ref 55–135)
ALT SERPL W/O P-5'-P-CCNC: 14 U/L (ref 10–44)
ANION GAP SERPL CALC-SCNC: 12 MMOL/L (ref 8–16)
AST SERPL-CCNC: 21 U/L (ref 10–40)
BASOPHILS # BLD AUTO: 0.03 K/UL (ref 0–0.2)
BASOPHILS NFR BLD: 0.2 % (ref 0–1.9)
BILIRUB SERPL-MCNC: 0.3 MG/DL (ref 0.1–1)
BUN SERPL-MCNC: 17 MG/DL (ref 6–20)
CALCIUM SERPL-MCNC: 9.5 MG/DL (ref 8.7–10.5)
CHLORIDE SERPL-SCNC: 95 MMOL/L (ref 95–110)
CO2 SERPL-SCNC: 26 MMOL/L (ref 23–29)
CREAT SERPL-MCNC: 1.3 MG/DL (ref 0.5–1.4)
DIFFERENTIAL METHOD: ABNORMAL
EOSINOPHIL # BLD AUTO: 0 K/UL (ref 0–0.5)
EOSINOPHIL NFR BLD: 0.2 % (ref 0–8)
ERYTHROCYTE [DISTWIDTH] IN BLOOD BY AUTOMATED COUNT: 13.2 % (ref 11.5–14.5)
EST. GFR  (AFRICAN AMERICAN): >60 ML/MIN/1.73 M^2
EST. GFR  (NON AFRICAN AMERICAN): >60 ML/MIN/1.73 M^2
GLUCOSE SERPL-MCNC: 89 MG/DL (ref 70–110)
HCT VFR BLD AUTO: 36.7 % (ref 40–54)
HGB BLD-MCNC: 12.2 G/DL (ref 14–18)
IMM GRANULOCYTES # BLD AUTO: 0.1 K/UL (ref 0–0.04)
IMM GRANULOCYTES NFR BLD AUTO: 0.8 % (ref 0–0.5)
LYMPHOCYTES # BLD AUTO: 2.4 K/UL (ref 1–4.8)
LYMPHOCYTES NFR BLD: 18 % (ref 18–48)
MCH RBC QN AUTO: 31.4 PG (ref 27–31)
MCHC RBC AUTO-ENTMCNC: 33.2 G/DL (ref 32–36)
MCV RBC AUTO: 95 FL (ref 82–98)
MONOCYTES # BLD AUTO: 1.1 K/UL (ref 0.3–1)
MONOCYTES NFR BLD: 8 % (ref 4–15)
NEUTROPHILS # BLD AUTO: 9.6 K/UL (ref 1.8–7.7)
NEUTROPHILS NFR BLD: 72.8 % (ref 38–73)
NRBC BLD-RTO: 0 /100 WBC
PLATELET # BLD AUTO: 335 K/UL (ref 150–350)
PMV BLD AUTO: 10.1 FL (ref 9.2–12.9)
POTASSIUM SERPL-SCNC: 4.4 MMOL/L (ref 3.5–5.1)
PROT SERPL-MCNC: 10.2 G/DL (ref 6–8.4)
RBC # BLD AUTO: 3.88 M/UL (ref 4.6–6.2)
SODIUM SERPL-SCNC: 133 MMOL/L (ref 136–145)
WBC # BLD AUTO: 13.2 K/UL (ref 3.9–12.7)

## 2020-10-29 PROCEDURE — 87147 CULTURE TYPE IMMUNOLOGIC: CPT

## 2020-10-29 PROCEDURE — 10060 I&D ABSCESS SIMPLE/SINGLE: CPT

## 2020-10-29 PROCEDURE — 96365 THER/PROPH/DIAG IV INF INIT: CPT

## 2020-10-29 PROCEDURE — 85025 COMPLETE CBC W/AUTO DIFF WBC: CPT

## 2020-10-29 PROCEDURE — 36415 COLL VENOUS BLD VENIPUNCTURE: CPT

## 2020-10-29 PROCEDURE — 25000003 PHARM REV CODE 250: Performed by: EMERGENCY MEDICINE

## 2020-10-29 PROCEDURE — 99284 EMERGENCY DEPT VISIT MOD MDM: CPT | Mod: 25

## 2020-10-29 PROCEDURE — 87070 CULTURE OTHR SPECIMN AEROBIC: CPT

## 2020-10-29 PROCEDURE — 80053 COMPREHEN METABOLIC PANEL: CPT

## 2020-10-29 RX ORDER — HYDROCODONE BITARTRATE AND ACETAMINOPHEN 5; 325 MG/1; MG/1
1 TABLET ORAL EVERY 4 HOURS PRN
Qty: 12 TABLET | Refills: 0 | Status: SHIPPED | OUTPATIENT
Start: 2020-10-29 | End: 2020-10-31

## 2020-10-29 RX ORDER — CLINDAMYCIN PHOSPHATE 150 MG/ML
600 INJECTION, SOLUTION INTRAVENOUS
Status: DISCONTINUED | OUTPATIENT
Start: 2020-10-29 | End: 2020-10-29

## 2020-10-29 RX ORDER — CLINDAMYCIN HYDROCHLORIDE 150 MG/1
300 CAPSULE ORAL 4 TIMES DAILY
Qty: 56 CAPSULE | Refills: 0 | Status: SHIPPED | OUTPATIENT
Start: 2020-10-29 | End: 2020-11-05

## 2020-10-29 RX ORDER — CLINDAMYCIN PHOSPHATE 600 MG/50ML
600 INJECTION, SOLUTION INTRAVENOUS
Status: COMPLETED | OUTPATIENT
Start: 2020-10-29 | End: 2020-10-29

## 2020-10-29 RX ORDER — LIDOCAINE HYDROCHLORIDE 10 MG/ML
10 INJECTION INFILTRATION; PERINEURAL
Status: COMPLETED | OUTPATIENT
Start: 2020-10-29 | End: 2020-10-29

## 2020-10-29 RX ADMIN — CLINDAMYCIN IN 5 PERCENT DEXTROSE 600 MG: 12 INJECTION, SOLUTION INTRAVENOUS at 01:10

## 2020-10-29 RX ADMIN — LIDOCAINE HYDROCHLORIDE 10 ML: 10 INJECTION, SOLUTION INFILTRATION; PERINEURAL at 12:10

## 2020-10-29 NOTE — ED PROVIDER NOTES
Encounter Date: 10/29/2020    SCRIBE #1 NOTE: I, Romelia Freeman, am scribing for, and in the presence of, John Kincaid MD.       History     Chief Complaint   Patient presents with    Abscess     right groin - on abx - not getting better       Time seen by provider: 10:20 AM on 10/29/2020    Kenrick Peres is a 35 y.o. male who presents to the ED with an onset of abscess to right groin. Pt was seen at [facility] by general surgeon, Dr. Miller, who performed CT scan 2 days ago (10/27/20). Lymphadenitis was noted to right groin. No evidence of incarcerated hernia. Patient was discharged on Doxycyclin and Percocet 7.5-325mg. Patient noted increases swelling and tenderness to abscess in right groin yesterday. He states associated pain has improved slightly today, noting he did take pain medication PTA. He denies fever, penile discharge or pain, dysuria, or any other symptoms at this time. No pertinent PMHx or PSHx.     The history is provided by the patient.     Review of patient's allergies indicates:  No Known Allergies  No past medical history on file.  No past surgical history on file.  No family history on file.  Social History     Tobacco Use    Smoking status: Former Smoker     Types: Cigarettes   Substance Use Topics    Alcohol use: Yes    Drug use: Not on file     Review of Systems   Constitutional: Negative for fever.   HENT: Negative for sore throat.    Respiratory: Negative for shortness of breath.    Cardiovascular: Negative for chest pain.   Gastrointestinal: Negative for nausea.   Genitourinary: Negative for dysuria.   Musculoskeletal: Negative for back pain.   Skin: Negative for rash.        Positive for abscess and tenderness.   Neurological: Negative for weakness.   Hematological: Does not bruise/bleed easily.       Physical Exam     Initial Vitals [10/29/20 0845]   BP Pulse Resp Temp SpO2   135/60 90 18 97.9 °F (36.6 °C) 99 %      MAP       --         Physical  Exam    Nursing note and vitals reviewed.  Constitutional: He appears well-developed and well-nourished.  Non-toxic appearance. No distress.   HENT:   Head: Normocephalic and atraumatic.   Eyes: EOM are normal. Pupils are equal, round, and reactive to light.   Neck: Normal range of motion. Neck supple. No neck rigidity. No JVD present.   Cardiovascular: Normal rate, regular rhythm, normal heart sounds and intact distal pulses. Exam reveals no gallop and no friction rub.    No murmur heard.  Pulmonary/Chest: Breath sounds normal. He has no wheezes. He has no rhonchi. He has no rales.   Abdominal: Soft. Bowel sounds are normal. He exhibits no distension. There is no abdominal tenderness. There is no rebound and no guarding.   Genitourinary:    Genitourinary Comments: Enlarged and tender induration to right groin without erythema. Separation of outer layer of skin.     Musculoskeletal: Normal range of motion.   Neurological: He is alert and oriented to person, place, and time. He has normal strength and normal reflexes. No cranial nerve deficit or sensory deficit. He exhibits normal muscle tone. Coordination normal.   Skin: Skin is warm and dry.   Psychiatric: He has a normal mood and affect. His speech is normal and behavior is normal. He is not actively hallucinating.             ED Course   I & D - Incision and Drainage    Date/Time: 10/29/2020 1:57 PM  Location procedure was performed: [facility] EMERGENCY DEPARTMENT  Performed by: John Kincaid MD  Authorized by: John Kincaid MD   Consent Done: Yes  Consent: Verbal consent obtained.  Risks and benefits: risks, benefits and alternatives were discussed  Consent given by: patient  Patient identity confirmed: , name and verbally with patient  Type: abscess  Location: right groin.    Anesthesia:  Local Anesthetic: lidocaine 1% without epinephrine  Anesthetic total: 3 mL  Scalpel size: 10  Incision type: single straight  Complexity: simple  Drainage:  pus  Drainage amount: copious  Wound treatment: incision,  drainage,  deloculation,  expression of material and  wound packed  Patient tolerance: Patient tolerated the procedure well with no immediate complications        Labs Reviewed   CBC W/ AUTO DIFFERENTIAL - Abnormal; Notable for the following components:       Result Value    WBC 13.20 (*)     RBC 3.88 (*)     Hemoglobin 12.2 (*)     Hematocrit 36.7 (*)     MCH 31.4 (*)     Immature Granulocytes 0.8 (*)     Gran # (ANC) 9.6 (*)     Immature Grans (Abs) 0.10 (*)     Mono # 1.1 (*)     All other components within normal limits   COMPREHENSIVE METABOLIC PANEL - Abnormal; Notable for the following components:    Sodium 133 (*)     Total Protein 10.2 (*)     Albumin 2.6 (*)     All other components within normal limits   CULTURE, AEROBIC  (SPECIFY SOURCE)          Imaging Results    None          Medical Decision Making:   History:   Old Medical Records: I decided to obtain old medical records.  Initial Assessment:   Patient is a 35-year-old man who presents emergency department for right groin mass.  Recent CT showed abscessed right groin lymph node and patient started on doxycycline by General surgery.  Patient now with developing cellulitis, no systemic fever discussed with Dr. Caal who suggest Em provider incision drainage at bedside.  Patient given clindamycin IV because of surrounding cellulitis.  Mild leukocytosis of 13 K with no fever tachycardia on labs.  Abscess was incised drained de loculated with copious purulence expressed abscess was packed and sample sent wound culture.  Patient will be discharged home with clindamycin and pain medication.  He is to follow-up in for wound re-evaluation and packing removal/repacking.  Clinical Tests:   Lab Tests: Ordered and Reviewed            Scribe Attestation:   Scribe #1: I performed the above scribed service and the documentation accurately describes the services I performed. I attest to the accuracy of  the note.     I, Sanjiv Price, personally performed the services described in this documentation. All medical record entries made by the scribe were at my direction and in my presence.  I have reviewed the chart and agree that the record reflects my personal performance and is accurate and complete. John Kincaid MD.                  Clinical Impression:     ICD-10-CM ICD-9-CM   1. Abscess  L02.91 682.9                      Disposition:   Disposition: Discharged  Condition: Stable     ED Disposition Condition    Discharge Stable        ED Prescriptions     Medication Sig Dispense Start Date End Date Auth. Provider    clindamycin (CLEOCIN) 150 MG capsule Take 2 capsules (300 mg total) by mouth 4 (four) times daily. for 7 days 56 capsule 10/29/2020 11/5/2020 John Kincaid MD    HYDROcodone-acetaminophen (NORCO) 5-325 mg per tablet Take 1 tablet by mouth every 4 (four) hours as needed. 12 tablet 10/29/2020 10/31/2020 John Kincaid MD        Follow-up Information     Follow up With Specialties Details Why Contact Info    [facility] Emergency Medicine In 2 days For wound re-check [address] [place] [phone]    [facility] Emergency [facility]  As needed, If symptoms worsen [address] [place] [address] [phone]                                       John Kincaid MD  10/29/20 1502

## 2020-11-02 ENCOUNTER — HOSPITAL ENCOUNTER (EMERGENCY)
Facility: HOSPITAL | Age: 35
Discharge: HOME | End: 2020-11-02
Attending: EMERGENCY MEDICINE
Payer: MEDICAID

## 2020-11-02 VITALS
DIASTOLIC BLOOD PRESSURE: 70 MMHG | OXYGEN SATURATION: 99 % | BODY MASS INDEX: 18.65 KG/M2 | SYSTOLIC BLOOD PRESSURE: 125 MMHG | TEMPERATURE: 98 F | WEIGHT: 150 LBS | HEIGHT: 75 IN | RESPIRATION RATE: 16 BRPM | HEART RATE: 100 BPM

## 2020-11-02 DIAGNOSIS — Z51.89 WOUND CHECK, ABSCESS: Primary | ICD-10-CM

## 2020-11-02 LAB — BACTERIA SPEC AEROBE CULT: ABNORMAL

## 2020-11-02 PROCEDURE — 99281 EMR DPT VST MAYX REQ PHY/QHP: CPT

## 2020-11-02 NOTE — ED PROVIDER NOTES
Encounter Date: 11/2/2020    SCRIBE #1 NOTE: Kristina LIVINGSTON, prakash scribing for, and in the presence of, Harmeet Cabello MD.       History     Chief Complaint   Patient presents with    Wound Check     I&D Thursday        Time seen by provider: 8:36 AM on 11/02/2020    Kenrick Peres is a 35 y.o. male who presents to the ED for a wound check-up. He was seen in the ER 5 days ago on 10/28 where he required an I&D for abscess in the right groin. The patient received IV clindamycin and discharged with a prescription of clindamycin 300 mg QID.  Also on Bactrim and doxy.  He has been compliant with his antibiotics and states his symptoms have improved. The patient denies any other symptoms at this time. No PMHx or PSHx.    The history is provided by the patient.     Review of patient's allergies indicates:  No Known Allergies  No past medical history on file.  No past surgical history on file.  No family history on file.  Social History     Tobacco Use    Smoking status: Former Smoker     Types: Cigarettes   Substance Use Topics    Alcohol use: Yes    Drug use: Not on file     Review of Systems   Constitutional: Negative.    Gastrointestinal: Negative.    Skin: Positive for wound.   Hematological: Negative for adenopathy.     Physical Exam     Initial Vitals [11/02/20 0823]   BP Pulse Resp Temp SpO2   125/70 100 16 98.4 °F (36.9 °C) 99 %      MAP       --         Physical Exam    Nursing note and vitals reviewed.  Constitutional: He appears well-developed and well-nourished. He is not diaphoretic.  Non-toxic appearance. He does not have a sickly appearance. He does not appear ill. No distress.   HENT:   Head: Normocephalic and atraumatic.   Eyes: EOM are normal.   Neck: Normal range of motion. Neck supple. Normal range of motion present. No neck rigidity.   Pulmonary/Chest: No respiratory distress.   Genitourinary:    Genitourinary Comments: Packing removed. Well healing incision to the right groin.     Musculoskeletal:  Normal range of motion.   Neurological: He is alert and oriented to person, place, and time.   Skin: Skin is warm and dry. No rash noted.   Psychiatric: He has a normal mood and affect. His behavior is normal. Judgment and thought content normal.       ED Course   Procedures  Labs Reviewed - No data to display     Imaging Results    None          Medical Decision Making:   History:   Old Medical Records: I decided to obtain old medical records.  Old Records Summarized: other records.       <> Summary of Records: ER, culture results  Clinical Tests:   Lab Tests: Reviewed            Scribe Attestation:   Scribe #1: I performed the above scribed service and the documentation accurately describes the services I performed. I attest to the accuracy of the note.            ED Course as of Nov 02 0845   Mon Nov 02, 2020   0827 BP: 125/70 [EF]   0827 Temp: 98.4 °F (36.9 °C) [EF]   0827 Temp src: Oral [EF]   0827 Pulse: 100 [EF]   0827 Resp: 16 [EF]   0827 SpO2: 99 % [EF]   0840 Wound looks good..  stop  Doxy stop Bactrim.    [EF]      ED Course User Index  [EF] Harmeet Cabello MD          Clinical Impression:     ICD-10-CM ICD-9-CM   1. Wound check, abscess  Z51.89 V58.89                      Disposition:   Disposition: Discharged  Condition: Stable     ED Disposition Condition    Discharge Stable        ED Prescriptions     None        Follow-up Information     Follow up With Specialties Details Why Contact Info    [facility] Emergency Medicine  As needed, If symptoms worsen [address] [place] [id number] [phone]                          35-year-old presents with a healing I and D to the right groin.  This is very well-appearing.  I reviewed the culture results and he has strep growing from the culture.  He can stop the doxy and the Bactrim.  Continue the clinda.  Local wound care is needed.             Harmeet Cabello MD  11/02/20 5536

## 2020-11-03 ENCOUNTER — PATIENT OUTREACH (OUTPATIENT)
Dept: EMERGENCY MEDICINE | Facility: HOSPITAL | Age: 35
End: 2020-11-03

## 2020-11-05 NOTE — PROGRESS NOTES
Narda Temple  ED Navigator  Emergency Department    [place]: [facility] [place]  Role: Community Health Worker    Date: 11/05/2020  Patient Name: Kenrick Peres  MRN: [id number]  PCP: Primary Doctor No    Assessment:     Kenrick Peres is a 35 y.o. male who has presented to ED for No chief complaint on file.  . Patient has visited the ED 2 times in the past 3 months. Patient did not contact PCP.     ED Navigator Patient Assessment    Consent to Services  Does patient consent to completing the assessment?: Yes  Transportation  Does the patient have issues with Transportation?: No  Insurance Coverage  Do you have coverage/adequate coverage?: Yes  Type/kind of coverage: Medicaid - [place] [employer]  Is patient able to afford co-pays/deductibles?: Yes  Is patient able to afford HME or supplies?: Yes  Does patient have an established [facility] PCP?: No  Does patient need assistance finding a PCP?: No (Comment: Patient declined assistance with finding a PCP at this time but agreed to receive the resources)  Does the patient have a lack of adequate coverage?: No  Specialist Appointment  Did the patient come to the ED to see a specialist?: No  Does the patient have a pending specialist referral?: No  Does the patient have a specialist appointment made?: No  PCP Follow Up Appointment  Does the patient have a follow up appontment with their PCP?: No  When was the last time you saw your PCP?:  (Comment: Patient reports he does not have a PCP)  Why does the patient not have a follow up scheduled?: No established [facility]/outside PCP  Would you like an [facility] PCP?: No  Medications  Is patient able to afford medication?: Yes  Is patient unable to get medication due to lack of transportation?: No  Psychological  Does the patient have psycho-social concerns?: No  Food  Does the patient have concerns about food?: Yes  What concerns does the patient have regarding food?: Lack of access to food  Communication/Education  Does the  patient have limited English proficiency/English not primary language?: No  Does patient have low literacy and/or low health literacy?: No  Does patient have concerns with care?: No  Does patient have dissatisfaction with care?: No  Other Financial Concers  Does the patient have immediate financial distress?: No  Does the patient have general financial concerns?: No  Other Social Barriers/Concerns  Does the patient have any additional barriers or concerns?: Other (see comments) (Comment: Patient denied additional barriers)         Social History     Socioeconomic History    Marital status: Single     Spouse name: Not on file    Number of children: Not on file    Years of education: Not on file    Highest education level: Not on file   Occupational History    Not on file   Social Needs    Financial resource strain: Not very hard    Food insecurity     Worry: Sometimes true     Inability: Sometimes true    Transportation needs     Medical: No     Non-medical: No   Tobacco Use    Smoking status: Former Smoker     Types: Cigarettes   Substance and Sexual Activity    Alcohol use: Yes    Drug use: Not on file    Sexual activity: Yes   Lifestyle    Physical activity     Days per week: 1 day     Minutes per session: 60 min    Stress: Not at all   Relationships    Social connections     Talks on phone: Not on file     Gets together: Not on file     Attends [employer] service: Not on file     Active member of club or organization: Not on file     Attends meetings of clubs or organizations: Not on file     Relationship status: Not on file   Other Topics Concern    Not on file   Social History Narrative    Not on file       Plan:   Patient reports he does not have a PCP and declined assistance with obtaining a PCP at this time. Patient was provided education on establishing care with a PCP and [facility]  Patient request resources for food moss.  Patient states he applied for  SNAP benefits but was declined due to his income. Patient agreed to emailed resources for Medicaid PCP Information Line ([phone]), food bank/pantry resources, [facility] 24/7 Nurse triage line, and information on [facility].     Education on COVID-19 provided to patient during call. Instructed patient to call [facility] first if experiencing fever greater than 100.4, coughing and SOB. Provided patient [facility] phone number [phone] as well as [facility] COVID-19 Hotline [phone], verbalized understanding. Informed patient that they may contact their PCP for further guidance as well. Reviewed with patient precautions to take to help prevent the spread of this respiratory virus: Wash hands often (for 20 seconds singing Happy Birthday), cover your cough or sneeze into your elbow or a tissue, stay away from people who are sick, don't touch your eyes, nose or mouth with unwashed hands. Per the state mandate, make sure you are wearing a mask in public places and continue to practice safe social distancing.

## 2020-11-20 NOTE — TELEPHONE ENCOUNTER
I have reviewed the notes done by the ED Navigator, and I concur with the documentation.  Yesi Bailon RN

## 2021-04-21 ENCOUNTER — OFFICE VISIT (OUTPATIENT)
Dept: URGENT CARE | Facility: CLINIC | Age: 36
End: 2021-04-21
Payer: MEDICAID

## 2021-04-21 VITALS
WEIGHT: 151 LBS | HEART RATE: 88 BPM | HEIGHT: 75 IN | OXYGEN SATURATION: 97 % | SYSTOLIC BLOOD PRESSURE: 123 MMHG | RESPIRATION RATE: 16 BRPM | TEMPERATURE: 99 F | DIASTOLIC BLOOD PRESSURE: 77 MMHG | BODY MASS INDEX: 18.77 KG/M2

## 2021-04-21 DIAGNOSIS — L30.9 DERMATITIS: ICD-10-CM

## 2021-04-21 DIAGNOSIS — Z20.2 POSSIBLE EXPOSURE TO STD: Primary | ICD-10-CM

## 2021-04-21 LAB
BILIRUB UR QL STRIP: NEGATIVE
GLUCOSE UR QL STRIP: NEGATIVE
KETONES UR QL STRIP: NEGATIVE
LEUKOCYTE ESTERASE UR QL STRIP: NEGATIVE
PH, POC UA: 8
POC BLOOD, URINE: NEGATIVE
POC NITRATES, URINE: NEGATIVE
PROT UR QL STRIP: NEGATIVE
SP GR UR STRIP: 1 (ref 1–1.03)
UROBILINOGEN UR STRIP-ACNC: NORMAL (ref 0.3–2.2)

## 2021-04-21 PROCEDURE — 99214 PR OFFICE/OUTPT VISIT, EST, LEVL IV, 30-39 MIN: ICD-10-PCS | Mod: 25,S$GLB,, | Performed by: NURSE PRACTITIONER

## 2021-04-21 PROCEDURE — 81003 POCT URINALYSIS, DIPSTICK, AUTOMATED, W/O SCOPE: ICD-10-PCS | Mod: QW,S$GLB,, | Performed by: NURSE PRACTITIONER

## 2021-04-21 PROCEDURE — 99214 OFFICE O/P EST MOD 30 MIN: CPT | Mod: 25,S$GLB,, | Performed by: NURSE PRACTITIONER

## 2021-04-21 PROCEDURE — 81003 URINALYSIS AUTO W/O SCOPE: CPT | Mod: QW,S$GLB,, | Performed by: NURSE PRACTITIONER

## 2021-04-21 RX ORDER — TRIAMCINOLONE ACETONIDE 1 MG/G
OINTMENT TOPICAL 2 TIMES DAILY
Qty: 30 G | Refills: 0 | Status: SHIPPED | OUTPATIENT
Start: 2021-04-21 | End: 2022-04-27

## 2021-04-21 RX ORDER — AZELASTINE 1 MG/ML
1 SPRAY, METERED NASAL 2 TIMES DAILY
Qty: 30 ML | Refills: 2 | Status: SHIPPED | OUTPATIENT
Start: 2021-04-21 | End: 2022-04-27 | Stop reason: SDUPTHER

## 2021-04-23 LAB
C TRACH RRNA SPEC QL NAA+PROBE: NEGATIVE
N GONORRHOEA RRNA SPEC QL NAA+PROBE: NEGATIVE
T VAGINALIS DNA SPEC QL NAA+PROBE: NEGATIVE

## 2021-04-27 ENCOUNTER — TELEPHONE (OUTPATIENT)
Dept: URGENT CARE | Facility: CLINIC | Age: 36
End: 2021-04-27

## 2021-04-29 ENCOUNTER — PATIENT MESSAGE (OUTPATIENT)
Dept: RESEARCH | Facility: HOSPITAL | Age: 36
End: 2021-04-29

## 2021-11-08 ENCOUNTER — OCCUPATIONAL HEALTH (OUTPATIENT)
Dept: URGENT CARE | Facility: CLINIC | Age: 36
End: 2021-11-08

## 2021-11-08 DIAGNOSIS — Z00.00 ENCOUNTER FOR PHYSICAL EXAMINATION: Primary | ICD-10-CM

## 2021-11-08 LAB
CTP QC/QA: YES
POC 10 PANEL DRUG SCREEN: NEGATIVE

## 2021-11-08 PROCEDURE — 80305 DRUG TEST PRSMV DIR OPT OBS: CPT | Mod: S$GLB,,, | Performed by: NURSE PRACTITIONER

## 2021-11-08 PROCEDURE — 99499 UNLISTED E&M SERVICE: CPT | Mod: S$GLB,,, | Performed by: NURSE PRACTITIONER

## 2021-11-08 PROCEDURE — 97750 PHYSICAL PERFORMANCE TEST: CPT | Mod: S$GLB,,, | Performed by: NURSE PRACTITIONER

## 2021-11-08 PROCEDURE — 97750 LIFT TEST: ICD-10-PCS | Mod: S$GLB,,, | Performed by: NURSE PRACTITIONER

## 2021-11-08 PROCEDURE — 97750 PR AGILITY TEST: ICD-10-PCS | Mod: S$GLB,,, | Performed by: NURSE PRACTITIONER

## 2021-11-08 PROCEDURE — 80305 POCT RAPID DRUG SCREEN 10 PANEL: ICD-10-PCS | Mod: S$GLB,,, | Performed by: NURSE PRACTITIONER

## 2021-11-08 PROCEDURE — 99080 OSHA QUESTIONNAIRE: ICD-10-PCS | Mod: S$GLB,,, | Performed by: NURSE PRACTITIONER

## 2021-11-08 PROCEDURE — 99499 PHYSICAL, BASIC COMPLEXITY: ICD-10-PCS | Mod: S$GLB,,, | Performed by: NURSE PRACTITIONER

## 2021-11-08 PROCEDURE — 92552 PURE TONE AUDIOMETRY AIR: CPT | Mod: S$GLB,,, | Performed by: NURSE PRACTITIONER

## 2021-11-08 PROCEDURE — 99080 SPECIAL REPORTS OR FORMS: CPT | Mod: S$GLB,,, | Performed by: NURSE PRACTITIONER

## 2021-11-08 PROCEDURE — 92552 AUDIOGRAM OCC MED: ICD-10-PCS | Mod: S$GLB,,, | Performed by: NURSE PRACTITIONER

## 2021-11-08 PROCEDURE — 94010 PULMONARY FUNCTION SCREENING (OCC MED PHYSICALS): ICD-10-PCS | Mod: S$GLB,,, | Performed by: NURSE PRACTITIONER

## 2021-11-08 PROCEDURE — 94010 BREATHING CAPACITY TEST: CPT | Mod: S$GLB,,, | Performed by: NURSE PRACTITIONER

## 2022-04-22 ENCOUNTER — CLINICAL SUPPORT (OUTPATIENT)
Dept: OTHER | Facility: CLINIC | Age: 37
End: 2022-04-22
Payer: COMMERCIAL

## 2022-04-22 DIAGNOSIS — Z00.8 ENCOUNTER FOR OTHER GENERAL EXAMINATION: ICD-10-CM

## 2022-04-23 VITALS
WEIGHT: 150 LBS | BODY MASS INDEX: 18.65 KG/M2 | HEIGHT: 75 IN | DIASTOLIC BLOOD PRESSURE: 70 MMHG | SYSTOLIC BLOOD PRESSURE: 112 MMHG

## 2022-04-23 LAB
HDLC SERPL-MCNC: 60 MG/DL
POC CHOLESTEROL, LDL (DOCK): 53.04 MG/DL
POC CHOLESTEROL, TOTAL: 126 MG/DL
POC GLUCOSE, FASTING: 78 MG/DL (ref 60–110)
TRIGL SERPL-MCNC: 60 MG/DL

## 2022-04-27 ENCOUNTER — OFFICE VISIT (OUTPATIENT)
Dept: FAMILY MEDICINE | Facility: CLINIC | Age: 37
End: 2022-04-27
Payer: COMMERCIAL

## 2022-04-27 VITALS
WEIGHT: 150.56 LBS | HEART RATE: 73 BPM | SYSTOLIC BLOOD PRESSURE: 100 MMHG | OXYGEN SATURATION: 98 % | DIASTOLIC BLOOD PRESSURE: 64 MMHG | BODY MASS INDEX: 18.72 KG/M2 | HEIGHT: 75 IN

## 2022-04-27 DIAGNOSIS — Z23 NEED FOR TD VACCINE: ICD-10-CM

## 2022-04-27 DIAGNOSIS — J30.9 CHRONIC ALLERGIC RHINITIS: ICD-10-CM

## 2022-04-27 DIAGNOSIS — Z76.89 ENCOUNTER TO ESTABLISH CARE: Primary | ICD-10-CM

## 2022-04-27 DIAGNOSIS — B35.1 FUNGAL TOENAIL INFECTION: ICD-10-CM

## 2022-04-27 PROCEDURE — 1159F PR MEDICATION LIST DOCUMENTED IN MEDICAL RECORD: ICD-10-PCS | Mod: CPTII,S$GLB,, | Performed by: NURSE PRACTITIONER

## 2022-04-27 PROCEDURE — 1159F MED LIST DOCD IN RCRD: CPT | Mod: CPTII,S$GLB,, | Performed by: NURSE PRACTITIONER

## 2022-04-27 PROCEDURE — 99204 OFFICE O/P NEW MOD 45 MIN: CPT | Mod: 25,S$GLB,, | Performed by: NURSE PRACTITIONER

## 2022-04-27 PROCEDURE — 90714 TD VACC NO PRESV 7 YRS+ IM: CPT | Mod: S$GLB,,, | Performed by: NURSE PRACTITIONER

## 2022-04-27 PROCEDURE — 90714 TD VACCINE GREATER THAN OR EQUAL TO 7YO PRESERVATIVE FREE IM: ICD-10-PCS | Mod: S$GLB,,, | Performed by: NURSE PRACTITIONER

## 2022-04-27 PROCEDURE — 1160F PR REVIEW ALL MEDS BY PRESCRIBER/CLIN PHARMACIST DOCUMENTED: ICD-10-PCS | Mod: CPTII,S$GLB,, | Performed by: NURSE PRACTITIONER

## 2022-04-27 PROCEDURE — 3008F BODY MASS INDEX DOCD: CPT | Mod: CPTII,S$GLB,, | Performed by: NURSE PRACTITIONER

## 2022-04-27 PROCEDURE — 3078F DIAST BP <80 MM HG: CPT | Mod: CPTII,S$GLB,, | Performed by: NURSE PRACTITIONER

## 2022-04-27 PROCEDURE — 3074F SYST BP LT 130 MM HG: CPT | Mod: CPTII,S$GLB,, | Performed by: NURSE PRACTITIONER

## 2022-04-27 PROCEDURE — 99999 PR PBB SHADOW E&M-EST. PATIENT-LVL III: CPT | Mod: PBBFAC,,, | Performed by: NURSE PRACTITIONER

## 2022-04-27 PROCEDURE — 90471 TD VACCINE GREATER THAN OR EQUAL TO 7YO PRESERVATIVE FREE IM: ICD-10-PCS | Mod: S$GLB,,, | Performed by: NURSE PRACTITIONER

## 2022-04-27 PROCEDURE — 3074F PR MOST RECENT SYSTOLIC BLOOD PRESSURE < 130 MM HG: ICD-10-PCS | Mod: CPTII,S$GLB,, | Performed by: NURSE PRACTITIONER

## 2022-04-27 PROCEDURE — 3078F PR MOST RECENT DIASTOLIC BLOOD PRESSURE < 80 MM HG: ICD-10-PCS | Mod: CPTII,S$GLB,, | Performed by: NURSE PRACTITIONER

## 2022-04-27 PROCEDURE — 1160F RVW MEDS BY RX/DR IN RCRD: CPT | Mod: CPTII,S$GLB,, | Performed by: NURSE PRACTITIONER

## 2022-04-27 PROCEDURE — 99999 PR PBB SHADOW E&M-EST. PATIENT-LVL III: ICD-10-PCS | Mod: PBBFAC,,, | Performed by: NURSE PRACTITIONER

## 2022-04-27 PROCEDURE — 99204 PR OFFICE/OUTPT VISIT, NEW, LEVL IV, 45-59 MIN: ICD-10-PCS | Mod: 25,S$GLB,, | Performed by: NURSE PRACTITIONER

## 2022-04-27 PROCEDURE — 3008F PR BODY MASS INDEX (BMI) DOCUMENTED: ICD-10-PCS | Mod: CPTII,S$GLB,, | Performed by: NURSE PRACTITIONER

## 2022-04-27 PROCEDURE — 90471 IMMUNIZATION ADMIN: CPT | Mod: S$GLB,,, | Performed by: NURSE PRACTITIONER

## 2022-04-27 RX ORDER — CICLOPIROX 80 MG/ML
SOLUTION TOPICAL NIGHTLY
Qty: 6.6 ML | Refills: 2 | Status: SHIPPED | OUTPATIENT
Start: 2022-04-27 | End: 2022-11-01

## 2022-04-27 RX ORDER — AZELASTINE 1 MG/ML
1 SPRAY, METERED NASAL 2 TIMES DAILY
Qty: 30 ML | Refills: 2 | Status: SHIPPED | OUTPATIENT
Start: 2022-04-27 | End: 2023-04-27

## 2022-04-27 NOTE — PROGRESS NOTES
"Subjective:       Patient ID: Kenrick Peres is a 36 y.o. male.    Chief Complaint: No chief complaint on file.    HPI     Patient presents today to establish care. He is new to me. Patient has no complaints at today's visit.    History reviewed. No pertinent past medical history.    Review of patient's allergies indicates:  No Known Allergies      Current Outpatient Medications:     azelastine (ASTELIN) 137 mcg (0.1 %) nasal spray, 1 spray (137 mcg total) by Nasal route 2 (two) times daily., Disp: 30 mL, Rfl: 2    ciclopirox (PENLAC) 8 % Soln, Apply topically nightly., Disp: 6.6 mL, Rfl: 2    Review of Systems   Constitutional: Negative for unexpected weight change.   HENT: Negative for trouble swallowing.    Eyes: Negative for visual disturbance.   Respiratory: Negative for shortness of breath.    Cardiovascular: Negative for chest pain, palpitations and leg swelling.   Gastrointestinal: Negative for blood in stool.   Genitourinary: Negative for hematuria.   Skin: Negative for rash.   Allergic/Immunologic: Negative for immunocompromised state.   Neurological: Negative for headaches.   Hematological: Does not bruise/bleed easily.   Psychiatric/Behavioral: Negative for agitation. The patient is not nervous/anxious.        Objective:      /64 (BP Location: Left arm, Patient Position: Sitting, BP Method: Small (Manual))   Pulse 73   Ht 6' 3" (1.905 m)   Wt 68.3 kg (150 lb 9.2 oz)   SpO2 98%   BMI 18.82 kg/m²   Physical Exam  Constitutional:       Appearance: He is well-developed.   Eyes:      Conjunctiva/sclera: Conjunctivae normal.      Pupils: Pupils are equal, round, and reactive to light.   Cardiovascular:      Rate and Rhythm: Normal rate and regular rhythm.      Heart sounds: Normal heart sounds.   Pulmonary:      Effort: Pulmonary effort is normal.      Breath sounds: Normal breath sounds.   Abdominal:      General: Bowel sounds are normal.      Palpations: Abdomen is soft.   Musculoskeletal:    "      General: Normal range of motion.      Cervical back: Normal range of motion and neck supple.   Skin:     General: Skin is warm and dry.   Neurological:      Mental Status: He is alert and oriented to person, place, and time.   Psychiatric:         Behavior: Behavior normal.         Thought Content: Thought content normal.         Judgment: Judgment normal.         Assessment:       1. Encounter to establish care    2. Chronic allergic rhinitis    3. Fungal toenail infection    4. Need for Td vaccine        Plan:       Encounter to establish care  -     CBC W/ AUTO DIFFERENTIAL; Future; Expected date: 04/27/2022  -     Lipid Panel; Future; Expected date: 04/27/2022  -     Comprehensive Metabolic Panel; Future; Expected date: 04/27/2022  -     TSH; Future; Expected date: 04/27/2022  -     Hepatitis C Antibody; Future; Expected date: 04/27/2022    Chronic allergic rhinitis  -     azelastine (ASTELIN) 137 mcg (0.1 %) nasal spray; 1 spray (137 mcg total) by Nasal route 2 (two) times daily.  Dispense: 30 mL; Refill: 2  Stable, continue medication  Fungal toenail infection  -     ciclopirox (PENLAC) 8 % Soln; Apply topically nightly.  Dispense: 6.6 mL; Refill: 2    Need for Td vaccine  -     (In Office Administered) Td Vaccine - Preservative Free          Time spent with patient: 20 minutes    Patient with be reevaluated in 6 months or sooner prn    Greater than 50% of this visit was spent counseling as described in above documentation:Yes

## 2022-11-01 ENCOUNTER — OFFICE VISIT (OUTPATIENT)
Dept: URGENT CARE | Facility: CLINIC | Age: 37
End: 2022-11-01
Payer: COMMERCIAL

## 2022-11-01 VITALS
BODY MASS INDEX: 19.34 KG/M2 | WEIGHT: 158.81 LBS | OXYGEN SATURATION: 97 % | RESPIRATION RATE: 20 BRPM | SYSTOLIC BLOOD PRESSURE: 133 MMHG | DIASTOLIC BLOOD PRESSURE: 78 MMHG | TEMPERATURE: 98 F | HEIGHT: 76 IN | HEART RATE: 64 BPM

## 2022-11-01 DIAGNOSIS — R36.0 PENILE DISCHARGE, WITHOUT BLOOD: ICD-10-CM

## 2022-11-01 DIAGNOSIS — R30.0 DYSURIA: Primary | ICD-10-CM

## 2022-11-01 LAB
BILIRUB UR QL STRIP: NEGATIVE
GLUCOSE UR QL STRIP: NEGATIVE
KETONES UR QL STRIP: NEGATIVE
LEUKOCYTE ESTERASE UR QL STRIP: NEGATIVE
PH, POC UA: 7.5
POC BLOOD, URINE: NEGATIVE
POC NITRATES, URINE: NEGATIVE
PROT UR QL STRIP: NEGATIVE
SP GR UR STRIP: 1.01 (ref 1–1.03)
UROBILINOGEN UR STRIP-ACNC: POSITIVE (ref 0.3–2.2)

## 2022-11-01 PROCEDURE — 81003 POCT URINALYSIS, DIPSTICK, AUTOMATED, W/O SCOPE: ICD-10-PCS | Mod: QW,S$GLB,, | Performed by: NURSE PRACTITIONER

## 2022-11-01 PROCEDURE — 99214 OFFICE O/P EST MOD 30 MIN: CPT | Mod: S$GLB,,, | Performed by: NURSE PRACTITIONER

## 2022-11-01 PROCEDURE — 99214 PR OFFICE/OUTPT VISIT, EST, LEVL IV, 30-39 MIN: ICD-10-PCS | Mod: S$GLB,,, | Performed by: NURSE PRACTITIONER

## 2022-11-01 PROCEDURE — 1160F PR REVIEW ALL MEDS BY PRESCRIBER/CLIN PHARMACIST DOCUMENTED: ICD-10-PCS | Mod: CPTII,S$GLB,, | Performed by: NURSE PRACTITIONER

## 2022-11-01 PROCEDURE — 3078F PR MOST RECENT DIASTOLIC BLOOD PRESSURE < 80 MM HG: ICD-10-PCS | Mod: CPTII,S$GLB,, | Performed by: NURSE PRACTITIONER

## 2022-11-01 PROCEDURE — 81003 URINALYSIS AUTO W/O SCOPE: CPT | Mod: QW,S$GLB,, | Performed by: NURSE PRACTITIONER

## 2022-11-01 PROCEDURE — 1159F PR MEDICATION LIST DOCUMENTED IN MEDICAL RECORD: ICD-10-PCS | Mod: CPTII,S$GLB,, | Performed by: NURSE PRACTITIONER

## 2022-11-01 PROCEDURE — 3008F BODY MASS INDEX DOCD: CPT | Mod: CPTII,S$GLB,, | Performed by: NURSE PRACTITIONER

## 2022-11-01 PROCEDURE — 3078F DIAST BP <80 MM HG: CPT | Mod: CPTII,S$GLB,, | Performed by: NURSE PRACTITIONER

## 2022-11-01 PROCEDURE — 1160F RVW MEDS BY RX/DR IN RCRD: CPT | Mod: CPTII,S$GLB,, | Performed by: NURSE PRACTITIONER

## 2022-11-01 PROCEDURE — 1159F MED LIST DOCD IN RCRD: CPT | Mod: CPTII,S$GLB,, | Performed by: NURSE PRACTITIONER

## 2022-11-01 PROCEDURE — 3075F PR MOST RECENT SYSTOLIC BLOOD PRESS GE 130-139MM HG: ICD-10-PCS | Mod: CPTII,S$GLB,, | Performed by: NURSE PRACTITIONER

## 2022-11-01 PROCEDURE — 3008F PR BODY MASS INDEX (BMI) DOCUMENTED: ICD-10-PCS | Mod: CPTII,S$GLB,, | Performed by: NURSE PRACTITIONER

## 2022-11-01 PROCEDURE — 3075F SYST BP GE 130 - 139MM HG: CPT | Mod: CPTII,S$GLB,, | Performed by: NURSE PRACTITIONER

## 2022-11-01 RX ORDER — DOXYCYCLINE 100 MG/1
100 CAPSULE ORAL 2 TIMES DAILY
Qty: 14 CAPSULE | Refills: 0 | Status: SHIPPED | OUTPATIENT
Start: 2022-11-01 | End: 2022-11-08

## 2022-11-01 NOTE — PROGRESS NOTES
"Subjective:       Patient ID: Kenrick Peres is a 37 y.o. male.    Vitals:  height is 6' 4" (1.93 m) and weight is 72 kg (158 lb 12.8 oz). His temperature is 98.3 °F (36.8 °C). His blood pressure is 133/78 and his pulse is 64. His respiration is 20 and oxygen saturation is 97%.     Chief Complaint: Dysuria    Unknown exposure    Dysuria   The current episode started in the past 7 days (2 days). The problem occurs every urination. The problem has been waxing and waning. The quality of the pain is described as burning. Associated symptoms include a discharge. Pertinent negatives include no chills, nausea or vomiting.     Constitution: Negative for chills and fever.   Gastrointestinal:  Negative for nausea, vomiting and diarrhea.   Genitourinary:  Positive for dysuria, penile discharge and penile pain. Negative for genital sore, penile swelling, scrotal swelling, testicular pain and pelvic pain.     Objective:      Physical Exam   Constitutional: He is oriented to person, place, and time. He appears well-developed.  Non-toxic appearance. He does not appear ill. No distress.   HENT:   Head: Normocephalic and atraumatic.   Nose: Nose normal.   Mouth/Throat: Oropharynx is clear and moist. Mucous membranes are moist.   Eyes: Conjunctivae and EOM are normal.   Cardiovascular: Normal rate.   Pulmonary/Chest: Effort normal. No respiratory distress.   Abdominal: Normal appearance.   Neurological: no focal deficit. He is alert and oriented to person, place, and time.   Skin: Skin is warm and dry. Capillary refill takes 2 to 3 seconds.   Psychiatric: His behavior is normal. Mood normal.   Nursing note and vitals reviewed.      Assessment:       1. Dysuria    2. Penile discharge, without blood          Plan:       Urine sent to lab pending.  Discussed with patient option to begin treatment verses waiting results of send out.  Patient desires to start antibiotics by mouth but will hold off on antibiotic injection if needed as " indicated by urine results positive for gonorrhea    Dysuria  -     POCT Urinalysis, Dipstick, Automated, W/O Scope  -     CT/NG, T. vaginalis; Future; Expected date: 11/01/2022  -     doxycycline (MONODOX) 100 MG capsule; Take 1 capsule (100 mg total) by mouth 2 (two) times daily. for 7 days  Dispense: 14 capsule; Refill: 0    Penile discharge, without blood  -     doxycycline (MONODOX) 100 MG capsule; Take 1 capsule (100 mg total) by mouth 2 (two) times daily. for 7 days  Dispense: 14 capsule; Refill: 0       Doxycycline twice a day for 7 days take until completion and in his entirety.  Take with food and a full glass of water with each does, do not lie down for 1 hour after taking each dose.  Doxycycline can cause significant sun sensitivity so be mindful of sun exposure    Urine test sent to lab, expect 3-5 days for results.  Do not have unprotected intercourse until results have indicated the type of infection and that the treatment has been completed and appropriate with additional 7 days after the last antibiotic taken in its entirety

## 2022-11-01 NOTE — PATIENT INSTRUCTIONS
Doxycycline twice a day for 7 days take until completion and in his entirety.  Take with food and a full glass of water with each does, do not lie down for 1 hour after taking each dose.  Doxycycline can cause significant sun sensitivity so be mindful of sun exposure    Urine test sent to lab, expect 3-5 days for results.  Do not have unprotected intercourse until results have indicated the type of infection and that the treatment has been completed and appropriate with additional 7 days after the last antibiotic taken in its entirety

## 2022-11-04 LAB
C TRACH RRNA SPEC QL NAA+PROBE: NEGATIVE
N GONORRHOEA RRNA SPEC QL NAA+PROBE: NEGATIVE
T VAGINALIS RRNA SPEC QL NAA+PROBE: NEGATIVE

## 2023-11-02 ENCOUNTER — OFFICE VISIT (OUTPATIENT)
Dept: URGENT CARE | Facility: CLINIC | Age: 38
End: 2023-11-02
Payer: MEDICAID

## 2023-11-02 VITALS
HEART RATE: 82 BPM | DIASTOLIC BLOOD PRESSURE: 70 MMHG | HEIGHT: 76 IN | WEIGHT: 154 LBS | BODY MASS INDEX: 18.75 KG/M2 | TEMPERATURE: 97 F | OXYGEN SATURATION: 97 % | SYSTOLIC BLOOD PRESSURE: 121 MMHG | RESPIRATION RATE: 18 BRPM
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
## 2023-11-02 DIAGNOSIS — S80.862A INSECT BITE OF LEFT LOWER LEG, INITIAL ENCOUNTER: Primary | ICD-10-CM

## 2023-11-02 DIAGNOSIS — J30.9 ALLERGIC SINUSITIS: ICD-10-CM

## 2023-11-02 DIAGNOSIS — S70.362A INSECT BITE OF LEFT THIGH, INITIAL ENCOUNTER: ICD-10-CM

## 2023-11-02 PROCEDURE — 99214 OFFICE O/P EST MOD 30 MIN: CPT | Mod: S$GLB,,,

## 2023-11-02 PROCEDURE — 99214 PR OFFICE/OUTPT VISIT, EST, LEVL IV, 30-39 MIN: ICD-10-PCS | Mod: S$GLB,,,

## 2023-11-02 RX ORDER — AZELASTINE 1 MG/ML
1 SPRAY, METERED NASAL 2 TIMES DAILY
Qty: 30 ML | Refills: 1 | Status: SHIPPED | OUTPATIENT
Start: 2023-11-02

## 2023-11-02 RX ORDER — CEPHALEXIN 500 MG/1
500 CAPSULE ORAL EVERY 6 HOURS
Qty: 20 CAPSULE | Refills: 0 | Status: SHIPPED | OUTPATIENT
Start: 2023-11-02 | End: 2023-11-07

## 2023-11-02 NOTE — PROGRESS NOTES
"Subjective:      Patient ID: Kenrick Peres is a 38 y.o. male.    Vitals:  height is 6' 4" (1.93 m) and weight is 69.9 kg (154 lb). His temperature is 97.4 °F (36.3 °C). His blood pressure is 121/70 and his pulse is 82. His respiration is 18 and oxygen saturation is 97%.     Chief Complaint: Insect Bite    Two bites on left knee and hip    Insect Bite  Pertinent negatives include no chills, fatigue or fever.     Constitution: Negative. Negative for chills, fatigue and fever.   HENT: Negative.     Neck: neck negative.   Cardiovascular: Negative.    Eyes: Negative.    Respiratory: Negative.     Gastrointestinal: Negative.    Endocrine: negative.   Genitourinary: Negative.    Musculoskeletal: Negative.    Skin:  Positive for lesion and erythema.   Allergic/Immunologic: Negative.    Neurological: Negative.    Hematologic/Lymphatic: Negative.       Objective:     Physical Exam   Constitutional: He is oriented to person, place, and time. He appears well-developed.   HENT:   Head: Normocephalic and atraumatic. Head is without abrasion, without contusion and without laceration.   Ears:   Right Ear: External ear normal.   Left Ear: External ear normal.   Nose: Nose normal.   Mouth/Throat: Oropharynx is clear and moist and mucous membranes are normal. Mucous membranes are moist.   Eyes: Conjunctivae, EOM and lids are normal. Pupils are equal, round, and reactive to light.   Neck: Trachea normal and phonation normal. Neck supple.   Cardiovascular: Normal rate and regular rhythm.   Pulmonary/Chest: Effort normal. No respiratory distress.   Musculoskeletal: Normal range of motion.         General: Normal range of motion.   Neurological: He is alert and oriented to person, place, and time.   Skin: Skin is warm, dry, intact and no rash. Capillary refill takes less than 2 seconds. erythema and lesion (bug bite left knee and thigh) No abrasion, No burn, No bruising and No ecchymosis        Psychiatric: His speech is normal and " behavior is normal. Mood, judgment and thought content normal.   Nursing note and vitals reviewed.      Assessment:     1. Insect bite of left lower leg, initial encounter    2. Insect bite of left thigh, initial encounter        Plan:       Insect bite of left lower leg, initial encounter  -     cephALEXin (KEFLEX) 500 MG capsule; Take 1 capsule (500 mg total) by mouth every 6 (six) hours. for 5 days  Dispense: 20 capsule; Refill: 0    Insect bite of left thigh, initial encounter  -     cephALEXin (KEFLEX) 500 MG capsule; Take 1 capsule (500 mg total) by mouth every 6 (six) hours. for 5 days  Dispense: 20 capsule; Refill: 0      Discussed medication management with patient who acknowledges understanding.  Discussed antibiotics.  Patient is agreeable to plan of care.

## 2023-11-02 NOTE — PROGRESS NOTES
"Subjective:      Patient ID: Kenrick Peres is a 38 y.o. male.    Vitals:  height is 6' 4" (1.93 m) and weight is 69.9 kg (154 lb). His temperature is 97.4 °F (36.3 °C). His blood pressure is 121/70 and his pulse is 82. His respiration is 18 and oxygen saturation is 97%.     Chief Complaint: Insect Bite    Two bites on left knee and hip    Insect Bite  This is a new problem. The current episode started in the past 7 days. The problem occurs constantly. The problem has been gradually worsening. Nothing aggravates the symptoms. He has tried nothing for the symptoms. The treatment provided no relief.     ROS   Objective:     Physical Exam    Assessment:     No diagnosis found.    Plan:       There are no diagnoses linked to this encounter.                "

## 2024-02-21 ENCOUNTER — OFFICE VISIT (OUTPATIENT)
Dept: URGENT CARE | Facility: CLINIC | Age: 39
End: 2024-02-21
Payer: MEDICAID

## 2024-02-21 VITALS
BODY MASS INDEX: 19.12 KG/M2 | DIASTOLIC BLOOD PRESSURE: 79 MMHG | OXYGEN SATURATION: 98 % | HEIGHT: 76 IN | RESPIRATION RATE: 18 BRPM | WEIGHT: 157 LBS | TEMPERATURE: 99 F | HEART RATE: 90 BPM | SYSTOLIC BLOOD PRESSURE: 133 MMHG

## 2024-02-21 DIAGNOSIS — R21 RASH: Primary | ICD-10-CM

## 2024-02-21 PROCEDURE — 99214 OFFICE O/P EST MOD 30 MIN: CPT | Mod: S$GLB,,, | Performed by: PHYSICIAN ASSISTANT

## 2024-02-21 RX ORDER — MUPIROCIN 20 MG/G
OINTMENT TOPICAL 2 TIMES DAILY
Qty: 30 G | Refills: 0 | Status: SHIPPED | OUTPATIENT
Start: 2024-02-21 | End: 2024-02-28

## 2024-02-21 RX ORDER — DOXYCYCLINE HYCLATE 100 MG
100 TABLET ORAL 2 TIMES DAILY
Qty: 28 TABLET | Refills: 0 | Status: SHIPPED | OUTPATIENT
Start: 2024-02-21 | End: 2024-03-06

## 2024-02-21 NOTE — PROGRESS NOTES
"Subjective:      Patient ID: Kenrick Peres is a 38 y.o. male.    Vitals:  height is 6' 4" (1.93 m) and weight is 71.2 kg (157 lb). His temperature is 98.5 °F (36.9 °C). His blood pressure is 133/79 and his pulse is 90. His respiration is 18 and oxygen saturation is 98%.     Chief Complaint: Rash    Patient is a 38-year-old male who presents with rash for three days. Denies past medical history.  He reports rash to face, abdomen, penis and left arm.  No significant pain.  Mild itching.  Denies dysuria, penile discharge or other complaints.  Reports new sexual contact approximately 2 weeks ago.        Constitution: Negative for chills and fever.   Cardiovascular:  Negative for chest pain.   Respiratory:  Negative for cough.    Gastrointestinal:  Negative for abdominal pain and vomiting.   Musculoskeletal:  Negative for pain and trauma.   Skin:  Positive for rash. Negative for erythema.   Neurological:  Negative for headaches.      Objective:     Physical Exam   Constitutional: He is oriented to person, place, and time. He appears well-developed.   HENT:   Head: Normocephalic and atraumatic. Head is without abrasion, without contusion and without laceration.   Ears:   Right Ear: External ear normal.   Left Ear: External ear normal.   Nose: Nose normal.   Mouth/Throat: Oropharynx is clear and moist and mucous membranes are normal.   Eyes: Conjunctivae, EOM and lids are normal.   Neck: Trachea normal and phonation normal. Neck supple.   Cardiovascular: Normal rate, regular rhythm and normal heart sounds.   Pulmonary/Chest: Effort normal and breath sounds normal. No stridor. No respiratory distress.   Musculoskeletal: Normal range of motion.         General: Normal range of motion.   Neurological: He is alert and oriented to person, place, and time.   Skin: Skin is warm, dry, intact and rash (circular lesions noted to face, abdomen, shaft of penis and right arm. no petehica or purpura. non-tender.). Capillary refill " takes less than 2 seconds. No abrasion, No burn, No bruising, No erythema and No ecchymosis   Psychiatric: His speech is normal and behavior is normal. Judgment and thought content normal.   Nursing note and vitals reviewed.      Assessment:     1. Rash        Plan:       Rash  -     RPR (DX) with reflex to titer and confirmatory testing; Future; Expected date: 02/21/2024  -     doxycycline (VIBRA-TABS) 100 MG tablet; Take 1 tablet (100 mg total) by mouth 2 (two) times daily. for 14 days  Dispense: 28 tablet; Refill: 0  -     mupirocin (BACTROBAN) 2 % ointment; Apply topically 2 (two) times daily. for 7 days  Dispense: 30 g; Refill: 0          Medical Decision Making:   History:   Old Medical Records: I decided to obtain old medical records.  Clinical Tests:   Lab Tests: Ordered and Reviewed  Urgent Care Management:  Urgent evaluation of a 38-year-old male who presents for rash that started 3 days ago.  Denies dysuria, penile discharge or other complaints.  New sexual contact approximately 2 weeks ago.  Rash noted to abdomen, penis, wrist and face.  Nontender, circular lesions.  No petechia or purpura.  No oral lesions.  Concern for possible syphilis.  RPR sent.  Patient requests to be treated prophylactically.  Doxycycline given.  Return precautions given.  Discussed if test is negative recommend follow up with Dermatology.  Patient voiced understanding.

## 2024-02-21 NOTE — PATIENT INSTRUCTIONS
It is possible this rash is caused by syphilis. The blood test is pending. You can either start the medication now or wait until you get the results. You can start the topical cream now. Do not have sex until you get these results. If the test is negative and the rash continues, please follow up with dermatology.

## 2025-08-07 ENCOUNTER — OFFICE VISIT (OUTPATIENT)
Dept: URGENT CARE | Facility: CLINIC | Age: 40
End: 2025-08-07
Payer: COMMERCIAL

## 2025-08-07 ENCOUNTER — LAB VISIT (OUTPATIENT)
Dept: LAB | Facility: HOSPITAL | Age: 40
End: 2025-08-07
Attending: STUDENT IN AN ORGANIZED HEALTH CARE EDUCATION/TRAINING PROGRAM
Payer: COMMERCIAL

## 2025-08-07 VITALS
HEART RATE: 71 BPM | OXYGEN SATURATION: 99 % | TEMPERATURE: 98 F | WEIGHT: 155 LBS | HEIGHT: 75 IN | SYSTOLIC BLOOD PRESSURE: 119 MMHG | BODY MASS INDEX: 19.27 KG/M2 | RESPIRATION RATE: 20 BRPM | DIASTOLIC BLOOD PRESSURE: 72 MMHG

## 2025-08-07 DIAGNOSIS — Z76.0 ENCOUNTER FOR MEDICATION REFILL: ICD-10-CM

## 2025-08-07 DIAGNOSIS — R21 RASH OF GENITAL AREA: ICD-10-CM

## 2025-08-07 DIAGNOSIS — R21 RASH OF HAND: ICD-10-CM

## 2025-08-07 DIAGNOSIS — J00 ACUTE RHINITIS: Primary | ICD-10-CM

## 2025-08-07 DIAGNOSIS — Z11.3 SCREENING EXAMINATION FOR STI: ICD-10-CM

## 2025-08-07 PROCEDURE — 86695 HERPES SIMPLEX TYPE 1 TEST: CPT

## 2025-08-07 PROCEDURE — 99214 OFFICE O/P EST MOD 30 MIN: CPT | Mod: S$GLB,,, | Performed by: STUDENT IN AN ORGANIZED HEALTH CARE EDUCATION/TRAINING PROGRAM

## 2025-08-07 PROCEDURE — 86593 SYPHILIS TEST NON-TREP QUANT: CPT

## 2025-08-07 PROCEDURE — 36415 COLL VENOUS BLD VENIPUNCTURE: CPT

## 2025-08-07 PROCEDURE — 86592 SYPHILIS TEST NON-TREP QUAL: CPT | Mod: 59

## 2025-08-07 PROCEDURE — 86593 SYPHILIS TEST NON-TREP QUANT: CPT | Mod: 91

## 2025-08-07 RX ORDER — AZELASTINE 1 MG/ML
2 SPRAY, METERED NASAL 2 TIMES DAILY
Qty: 30 ML | Refills: 0 | Status: SHIPPED | OUTPATIENT
Start: 2025-08-07 | End: 2026-08-07

## 2025-08-07 NOTE — PROGRESS NOTES
"Subjective:      Patient ID: Kenrick Peres is a 40 y.o. male.    Vitals:  height is 6' 3" (1.905 m) and weight is 70.3 kg (155 lb). His oral temperature is 97.9 °F (36.6 °C). His blood pressure is 119/72 and his pulse is 71. His respiration is 20 and oxygen saturation is 99%.     Chief Complaint: Rash    Patient is a 40-year-old male presenting to clinic for evaluation of rash.  Patient requesting STD testing.  Patient states that he was notified via his partner that he needed to get tested.  Patient states was not advised for what likely diagnosis.  Patient states that he has a rash on his right palm and his testicle.  Patient states does not have any itching or pain associated with rash.  Patient states has had a few days now.  Patient states that the rash is more of the discoloration than a raised area.  Patient states that he has not used any over-the-counter medicines for symptoms at this point.  Patient states no history of this prior.  Patient has no history of recurrent skin infections.  Patient states no new exposures or contacts to pets or plants, soaps or shampoos, laundry detergents, colognes, foods or drinks, or medications.  Patient denied any other urinary or STI symptoms than that of this rash.    Rash  The current episode started in the past 7 days. Location: hands and testicles. Pertinent negatives include no cough, diarrhea, fatigue, fever, shortness of breath or vomiting.       Constitution: Negative. Negative for chills, sweating, fatigue and fever.   HENT: Negative.     Neck: neck negative.   Cardiovascular: Negative.  Negative for chest pain and palpitations.   Eyes: Negative.    Respiratory: Negative.  Negative for chest tightness, cough and shortness of breath.    Gastrointestinal: Negative.  Negative for abdominal pain, nausea, vomiting and diarrhea.   Endocrine: negative.   Genitourinary:  Positive for genital sore. Negative for dysuria, frequency, urgency, urine decreased, flank pain, " bladder incontinence, hematuria, genital trauma, penile discharge, painful ejaculation, penile pain, penile swelling, scrotal swelling, testicular pain and pelvic pain.   Musculoskeletal: Negative.  Negative for muscle ache.   Skin:  Positive for rash.   Allergic/Immunologic: Positive for seasonal allergies (Requesting refill on Astelin). Negative for itching.   Neurological: Negative.  Negative for dizziness, light-headedness, passing out, headaches, disorientation and altered mental status.   Hematologic/Lymphatic: Negative.    Psychiatric/Behavioral: Negative.  Negative for altered mental status, disorientation and confusion.       Objective:     Physical Exam   Constitutional: He is oriented to person, place, and time. He appears well-developed. He is cooperative.  Non-toxic appearance. He does not appear ill. No distress.   HENT:   Head: Normocephalic and atraumatic.   Ears:   Right Ear: Hearing and external ear normal.   Left Ear: Hearing and external ear normal.   Nose: Nose normal. No mucosal edema or nasal deformity. No epistaxis. Right sinus exhibits no maxillary sinus tenderness and no frontal sinus tenderness. Left sinus exhibits no maxillary sinus tenderness and no frontal sinus tenderness.   Mouth/Throat: Uvula is midline, oropharynx is clear and moist and mucous membranes are normal. Mucous membranes are moist. No trismus in the jaw. Normal dentition. No uvula swelling. Oropharynx is clear.   Eyes: Conjunctivae and lids are normal. Pupils are equal, round, and reactive to light. Right eye exhibits no discharge. Left eye exhibits no discharge. No scleral icterus.   Neck: Trachea normal and phonation normal. Neck supple. No neck rigidity present.   Cardiovascular: Normal rate, regular rhythm, normal heart sounds and normal pulses.   Pulmonary/Chest: Effort normal and breath sounds normal. No respiratory distress. He has no wheezes. He has no rhonchi. He has no rales.   Abdominal: Normal appearance. He  exhibits no distension. Soft.   Genitourinary: Right testis shows no swelling and no tenderness. Left testis shows no swelling and no tenderness. No penile tenderness or penile swelling. No discharge found.         Musculoskeletal: Normal range of motion.         General: Normal range of motion.      Cervical back: He exhibits no tenderness.   Neurological: He is alert and oriented to person, place, and time. He exhibits normal muscle tone.   Skin: Skin is warm, dry, intact, not diaphoretic, not pale and rash. Capillary refill takes less than 2 seconds.        Psychiatric: His speech is normal and behavior is normal. Judgment and thought content normal.   Nursing note and vitals reviewed.      Assessment:     1. Acute rhinitis    2. Encounter for medication refill    3. Rash of hand    4. Rash of genital area    5. Screening examination for STI        Plan:       Acute rhinitis    Encounter for medication refill    Rash of hand  -     Treponema Pallidium Antibodies IgG, IgM; Future; Expected date: 08/07/2025  -     HSV 1 and/or 2 Combined AB, IgG & IgM; Future; Expected date: 08/07/2025    Rash of genital area  -     Treponema Pallidium Antibodies IgG, IgM; Future; Expected date: 08/07/2025  -     HSV 1 and/or 2 Combined AB, IgG & IgM; Future; Expected date: 08/07/2025    Screening examination for STI  -     Treponema Pallidium Antibodies IgG, IgM; Future; Expected date: 08/07/2025  -     HSV 1 and/or 2 Combined AB, IgG & IgM; Future; Expected date: 08/07/2025  -     CT/NG, T. vaginalis; Future; Expected date: 08/07/2025    Other orders  -     azelastine (ASTELIN) 137 mcg (0.1 %) nasal spray; 2 sprays (274 mcg total) by Nasal route 2 (two) times daily.  Dispense: 30 mL; Refill: 0                Refills provided on Astelin for allergic rhinitis.  Urine STI panel collected; will call results.  Patient provided with outpatient orders to be collected at the hospital setting.  Treatment pending results.  Recommend  avoidance of sexual intercourse until receiving results and completion of prescribed medicines.  Discussed safe sex practices with patient.  Take medications as prescribed.    Assure adequate hydration.    Follow-up with PCP in 1-2 days.  Return to clinic as needed.    To ED for any new or acutely worsening symptoms.    Patient in agreement with plan of care.    DISCLAIMER: Please note that my documentation in this Electronic Healthcare Record was produced using speech recognition software and therefore may contain errors related to that software system.These could include grammar, punctuation and spelling errors or the inclusion/exclusion of phrases that were not intended. Garbled syntax, mangled pronouns, and other bizarre constructions may be attributed to that software system.

## 2025-08-08 LAB
C TRACH RRNA SPEC QL NAA+PROBE: NEGATIVE
HSV1 IGG SERPL QL IA: POSITIVE
HSV2 IGG SERPL QL IA: POSITIVE
N GONORRHOEA RRNA SPEC QL NAA+PROBE: NEGATIVE
T PALLIDUM IGG+IGM SER QL: REACTIVE
T VAGINALIS RRNA SPEC QL NAA+PROBE: NEGATIVE

## 2025-08-09 LAB
RPR SER QL: REACTIVE
RPR SER-TITR: ABNORMAL {TITER}

## 2025-09-03 RX ORDER — AZELASTINE 1 MG/ML
SPRAY, METERED NASAL
Qty: 30 ML | Refills: 0 | Status: SHIPPED | OUTPATIENT
Start: 2025-09-03